# Patient Record
Sex: MALE | URBAN - METROPOLITAN AREA
[De-identification: names, ages, dates, MRNs, and addresses within clinical notes are randomized per-mention and may not be internally consistent; named-entity substitution may affect disease eponyms.]

---

## 2021-01-14 ENCOUNTER — TRANSCRIBE ORDERS (OUTPATIENT)
Dept: URGENT CARE | Facility: CLINIC | Age: 46
End: 2021-01-14

## 2021-01-14 DIAGNOSIS — Z02.1 PHYSICAL EXAM, PRE-EMPLOYMENT: Primary | ICD-10-CM

## 2021-01-14 PROCEDURE — 86787 VARICELLA-ZOSTER ANTIBODY: CPT | Performed by: PHYSICIAN ASSISTANT

## 2021-01-14 PROCEDURE — 86762 RUBELLA ANTIBODY: CPT | Performed by: PHYSICIAN ASSISTANT

## 2021-01-14 PROCEDURE — 86765 RUBEOLA ANTIBODY: CPT | Performed by: PHYSICIAN ASSISTANT

## 2021-01-14 PROCEDURE — 86735 MUMPS ANTIBODY: CPT | Performed by: PHYSICIAN ASSISTANT

## 2021-01-14 PROCEDURE — 86480 TB TEST CELL IMMUN MEASURE: CPT | Performed by: PHYSICIAN ASSISTANT

## 2021-03-18 ENCOUNTER — IMMUNIZATIONS (OUTPATIENT)
Dept: FAMILY MEDICINE CLINIC | Facility: HOSPITAL | Age: 46
End: 2021-03-18

## 2021-03-18 DIAGNOSIS — Z23 ENCOUNTER FOR IMMUNIZATION: Primary | ICD-10-CM

## 2021-03-18 PROCEDURE — 91301 SARS-COV-2 / COVID-19 MRNA VACCINE (MODERNA) 100 MCG: CPT

## 2021-03-18 PROCEDURE — 0011A SARS-COV-2 / COVID-19 MRNA VACCINE (MODERNA) 100 MCG: CPT

## 2021-04-16 ENCOUNTER — IMMUNIZATIONS (OUTPATIENT)
Dept: FAMILY MEDICINE CLINIC | Facility: HOSPITAL | Age: 46
End: 2021-04-16

## 2021-04-16 DIAGNOSIS — Z23 ENCOUNTER FOR IMMUNIZATION: Primary | ICD-10-CM

## 2021-04-16 PROCEDURE — 91301 SARS-COV-2 / COVID-19 MRNA VACCINE (MODERNA) 100 MCG: CPT

## 2021-04-16 PROCEDURE — 0012A SARS-COV-2 / COVID-19 MRNA VACCINE (MODERNA) 100 MCG: CPT

## 2022-02-11 ENCOUNTER — IMMUNIZATIONS (OUTPATIENT)
Dept: FAMILY MEDICINE CLINIC | Facility: HOSPITAL | Age: 47
End: 2022-02-11

## 2022-02-11 DIAGNOSIS — Z23 ENCOUNTER FOR IMMUNIZATION: Primary | ICD-10-CM

## 2022-02-11 PROCEDURE — 0064A COVID-19 MODERNA VACC 0.25 ML BOOSTER: CPT

## 2022-02-11 PROCEDURE — 91306 COVID-19 MODERNA VACC 0.25 ML BOOSTER: CPT

## 2023-04-06 ENCOUNTER — HOSPITAL ENCOUNTER (OUTPATIENT)
Dept: RADIOLOGY | Facility: HOSPITAL | Age: 48
Discharge: HOME | End: 2023-04-06

## 2023-04-06 DIAGNOSIS — R07.81 PAIN IN RIB: ICD-10-CM

## 2024-09-08 ENCOUNTER — APPOINTMENT (OUTPATIENT)
Dept: RADIOLOGY | Facility: HOSPITAL | Age: 49
End: 2024-09-08
Payer: OTHER MISCELLANEOUS

## 2024-09-08 ENCOUNTER — ANESTHESIA (OUTPATIENT)
Dept: PERIOP | Facility: HOSPITAL | Age: 49
End: 2024-09-08
Payer: OTHER MISCELLANEOUS

## 2024-09-08 ENCOUNTER — APPOINTMENT (EMERGENCY)
Dept: RADIOLOGY | Facility: HOSPITAL | Age: 49
End: 2024-09-08
Payer: OTHER MISCELLANEOUS

## 2024-09-08 ENCOUNTER — ANESTHESIA EVENT (OUTPATIENT)
Dept: PERIOP | Facility: HOSPITAL | Age: 49
End: 2024-09-08
Payer: OTHER MISCELLANEOUS

## 2024-09-08 ENCOUNTER — HOSPITAL ENCOUNTER (OUTPATIENT)
Facility: HOSPITAL | Age: 49
Setting detail: OBSERVATION
Discharge: HOME | End: 2024-09-10
Attending: EMERGENCY MEDICINE | Admitting: INTERNAL MEDICINE
Payer: OTHER MISCELLANEOUS

## 2024-09-08 DIAGNOSIS — S82.121A CLOSED DISPLACED FRACTURE OF LATERAL CONDYLE OF RIGHT TIBIA, INITIAL ENCOUNTER: Primary | ICD-10-CM

## 2024-09-08 DIAGNOSIS — S82.143A TIBIAL PLATEAU FRACTURE: ICD-10-CM

## 2024-09-08 PROBLEM — E11.9 TYPE 2 DIABETES MELLITUS WITHOUT COMPLICATION, WITHOUT LONG-TERM CURRENT USE OF INSULIN (HCC): Status: ACTIVE | Noted: 2024-09-08

## 2024-09-08 PROBLEM — E66.9 OBESITY: Status: ACTIVE | Noted: 2024-09-08
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
## 2024-09-08 PROBLEM — M25.561 ACUTE PAIN OF RIGHT KNEE: Status: ACTIVE | Noted: 2024-09-08

## 2024-09-08 PROBLEM — I10 PRIMARY HYPERTENSION: Status: ACTIVE | Noted: 2024-09-08

## 2024-09-08 PROBLEM — S82.141A CLOSED FRACTURE OF RIGHT TIBIAL PLATEAU: Status: ACTIVE | Noted: 2024-09-08

## 2024-09-08 PROBLEM — S82.151A: Status: ACTIVE | Noted: 2024-09-08

## 2024-09-08 LAB
ALBUMIN SERPL BCG-MCNC: 4.3 G/DL (ref 3.5–5)
ALP SERPL-CCNC: 56 U/L (ref 34–104)
ALT SERPL W P-5'-P-CCNC: 35 U/L (ref 7–52)
ANION GAP SERPL CALCULATED.3IONS-SCNC: 11 MMOL/L (ref 4–13)
APTT PPP: 20 SECONDS (ref 23–34)
AST SERPL W P-5'-P-CCNC: 34 U/L (ref 13–39)
BASOPHILS # BLD AUTO: 0.06 THOUSANDS/ÂΜL (ref 0–0.1)
BASOPHILS NFR BLD AUTO: 1 % (ref 0–1)
BILIRUB SERPL-MCNC: 0.53 MG/DL (ref 0.2–1)
BUN SERPL-MCNC: 20 MG/DL (ref 5–25)
CALCIUM SERPL-MCNC: 9.4 MG/DL (ref 8.4–10.2)
CHLORIDE SERPL-SCNC: 102 MMOL/L (ref 96–108)
CO2 SERPL-SCNC: 22 MMOL/L (ref 21–32)
CREAT SERPL-MCNC: 0.98 MG/DL (ref 0.6–1.3)
EOSINOPHIL # BLD AUTO: 0.17 THOUSAND/ÂΜL (ref 0–0.61)
EOSINOPHIL NFR BLD AUTO: 2 % (ref 0–6)
ERYTHROCYTE [DISTWIDTH] IN BLOOD BY AUTOMATED COUNT: 12.8 % (ref 11.6–15.1)
GFR SERPL CREATININE-BSD FRML MDRD: 92 ML/MIN/1.73SQ M
GLUCOSE SERPL-MCNC: 162 MG/DL (ref 65–140)
GLUCOSE SERPL-MCNC: 182 MG/DL (ref 65–140)
HCT VFR BLD AUTO: 45.5 % (ref 36.5–49.3)
HGB BLD-MCNC: 15.4 G/DL (ref 12–17)
IMM GRANULOCYTES # BLD AUTO: 0.03 THOUSAND/UL (ref 0–0.2)
IMM GRANULOCYTES NFR BLD AUTO: 0 % (ref 0–2)
INR PPP: 0.95 (ref 0.85–1.19)
LYMPHOCYTES # BLD AUTO: 3.21 THOUSANDS/ÂΜL (ref 0.6–4.47)
LYMPHOCYTES NFR BLD AUTO: 41 % (ref 14–44)
MCH RBC QN AUTO: 29.3 PG (ref 26.8–34.3)
MCHC RBC AUTO-ENTMCNC: 33.8 G/DL (ref 31.4–37.4)
MCV RBC AUTO: 87 FL (ref 82–98)
MONOCYTES # BLD AUTO: 0.87 THOUSAND/ÂΜL (ref 0.17–1.22)
MONOCYTES NFR BLD AUTO: 11 % (ref 4–12)
NEUTROPHILS # BLD AUTO: 3.43 THOUSANDS/ÂΜL (ref 1.85–7.62)
NEUTS SEG NFR BLD AUTO: 45 % (ref 43–75)
NRBC BLD AUTO-RTO: 0 /100 WBCS
PLATELET # BLD AUTO: 199 THOUSANDS/UL (ref 149–390)
PMV BLD AUTO: 10.2 FL (ref 8.9–12.7)
POTASSIUM SERPL-SCNC: 4.2 MMOL/L (ref 3.5–5.3)
PROT SERPL-MCNC: 6.8 G/DL (ref 6.4–8.4)
PROTHROMBIN TIME: 13.2 SECONDS (ref 12.3–15)
RBC # BLD AUTO: 5.25 MILLION/UL (ref 3.88–5.62)
SODIUM SERPL-SCNC: 135 MMOL/L (ref 135–147)
WBC # BLD AUTO: 7.77 THOUSAND/UL (ref 4.31–10.16)

## 2024-09-08 PROCEDURE — C1713 ANCHOR/SCREW BN/BN,TIS/BN: HCPCS | Performed by: ORTHOPAEDIC SURGERY

## 2024-09-08 PROCEDURE — 73590 X-RAY EXAM OF LOWER LEG: CPT

## 2024-09-08 PROCEDURE — 73564 X-RAY EXAM KNEE 4 OR MORE: CPT

## 2024-09-08 PROCEDURE — 27540 TREAT KNEE FRACTURE: CPT | Performed by: PHYSICIAN ASSISTANT

## 2024-09-08 PROCEDURE — 85610 PROTHROMBIN TIME: CPT | Performed by: EMERGENCY MEDICINE

## 2024-09-08 PROCEDURE — 99222 1ST HOSP IP/OBS MODERATE 55: CPT

## 2024-09-08 PROCEDURE — 27535 TREAT KNEE FRACTURE: CPT | Performed by: ORTHOPAEDIC SURGERY

## 2024-09-08 PROCEDURE — 27540 TREAT KNEE FRACTURE: CPT | Performed by: ORTHOPAEDIC SURGERY

## 2024-09-08 PROCEDURE — 73700 CT LOWER EXTREMITY W/O DYE: CPT

## 2024-09-08 PROCEDURE — 99205 OFFICE O/P NEW HI 60 MIN: CPT | Performed by: ORTHOPAEDIC SURGERY

## 2024-09-08 PROCEDURE — NC001 PR NO CHARGE: Performed by: ORTHOPAEDIC SURGERY

## 2024-09-08 PROCEDURE — 80053 COMPREHEN METABOLIC PANEL: CPT | Performed by: EMERGENCY MEDICINE

## 2024-09-08 PROCEDURE — 99285 EMERGENCY DEPT VISIT HI MDM: CPT | Performed by: EMERGENCY MEDICINE

## 2024-09-08 PROCEDURE — 27535 TREAT KNEE FRACTURE: CPT | Performed by: PHYSICIAN ASSISTANT

## 2024-09-08 PROCEDURE — 99284 EMERGENCY DEPT VISIT MOD MDM: CPT

## 2024-09-08 PROCEDURE — 96376 TX/PRO/DX INJ SAME DRUG ADON: CPT

## 2024-09-08 PROCEDURE — 36415 COLL VENOUS BLD VENIPUNCTURE: CPT | Performed by: EMERGENCY MEDICINE

## 2024-09-08 PROCEDURE — 96374 THER/PROPH/DIAG INJ IV PUSH: CPT

## 2024-09-08 PROCEDURE — 85730 THROMBOPLASTIN TIME PARTIAL: CPT | Performed by: EMERGENCY MEDICINE

## 2024-09-08 PROCEDURE — 85025 COMPLETE CBC W/AUTO DIFF WBC: CPT | Performed by: EMERGENCY MEDICINE

## 2024-09-08 PROCEDURE — 82948 REAGENT STRIP/BLOOD GLUCOSE: CPT

## 2024-09-08 DEVICE — 3.5MM CORTEX SCREW SELF-TAPPING 36MM: Type: IMPLANTABLE DEVICE | Site: LEG | Status: FUNCTIONAL

## 2024-09-08 DEVICE — 3.5MM CORTEX SCREW SELF-TAPPING 28MM: Type: IMPLANTABLE DEVICE | Site: LEG | Status: FUNCTIONAL

## 2024-09-08 DEVICE — 3.5MM CORTEX SCREW SELF-TAPPING 38MM: Type: IMPLANTABLE DEVICE | Site: LEG | Status: FUNCTIONAL

## 2024-09-08 DEVICE — WASHER 7.0MM: Type: IMPLANTABLE DEVICE | Site: LEG | Status: FUNCTIONAL

## 2024-09-08 DEVICE — 3.5MM LCP T-PLATE 3H HEAD/ 3H SHAFT/50MM-RIGHT ANGLE
Type: IMPLANTABLE DEVICE | Site: LEG | Status: FUNCTIONAL
Brand: LCP

## 2024-09-08 DEVICE — 3.5MM CORTEX SCREW SELF-TAPPING 26MM: Type: IMPLANTABLE DEVICE | Site: LEG | Status: FUNCTIONAL

## 2024-09-08 DEVICE — 3.5MM VA-LCP PROX TIBIA PLATE SMALL BEND/8H/147MM/RIGHT
Type: IMPLANTABLE DEVICE | Site: LEG | Status: FUNCTIONAL
Brand: VA-LCP

## 2024-09-08 DEVICE — 3.5MM VARIABLE ANGLE LOCKING SCREW/SLF-TPNG/STRDRV/80MM: Type: IMPLANTABLE DEVICE | Site: LEG | Status: FUNCTIONAL

## 2024-09-08 DEVICE — 3.5MM CORTEX SCREW SELF-TAPPING 50MM: Type: IMPLANTABLE DEVICE | Site: LEG | Status: FUNCTIONAL

## 2024-09-08 DEVICE — 3.5MM VARIABLE ANGLE LOCKING SCREW/SLF-TPNG/STRDRV/75MM: Type: IMPLANTABLE DEVICE | Site: LEG | Status: FUNCTIONAL

## 2024-09-08 DEVICE — 3.5MM PELVIC CORTEX SCREW SELF-TAPPING 70MM: Type: IMPLANTABLE DEVICE | Site: LEG | Status: FUNCTIONAL

## 2024-09-08 RX ORDER — LISINOPRIL 10 MG/1
10 TABLET ORAL DAILY
Status: DISCONTINUED | OUTPATIENT
Start: 2024-09-09 | End: 2024-09-09

## 2024-09-08 RX ORDER — MAGNESIUM HYDROXIDE 1200 MG/15ML
LIQUID ORAL AS NEEDED
Status: DISCONTINUED | OUTPATIENT
Start: 2024-09-08 | End: 2024-09-08 | Stop reason: HOSPADM

## 2024-09-08 RX ORDER — VANCOMYCIN HYDROCHLORIDE 1 G/20ML
INJECTION, POWDER, LYOPHILIZED, FOR SOLUTION INTRAVENOUS AS NEEDED
Status: DISCONTINUED | OUTPATIENT
Start: 2024-09-08 | End: 2024-09-08 | Stop reason: HOSPADM

## 2024-09-08 RX ORDER — HYDROMORPHONE HCL/PF 1 MG/ML
SYRINGE (ML) INJECTION AS NEEDED
Status: DISCONTINUED | OUTPATIENT
Start: 2024-09-08 | End: 2024-09-08

## 2024-09-08 RX ORDER — ACETAMINOPHEN 10 MG/ML
1000 INJECTION, SOLUTION INTRAVENOUS DAILY
Status: DISCONTINUED | OUTPATIENT
Start: 2024-09-08 | End: 2024-09-09

## 2024-09-08 RX ORDER — HYDROMORPHONE HCL/PF 1 MG/ML
0.5 SYRINGE (ML) INJECTION
Status: DISCONTINUED | OUTPATIENT
Start: 2024-09-08 | End: 2024-09-08 | Stop reason: HOSPADM

## 2024-09-08 RX ORDER — PANTOPRAZOLE SODIUM 40 MG/1
40 TABLET, DELAYED RELEASE ORAL DAILY
Status: DISCONTINUED | OUTPATIENT
Start: 2024-09-09 | End: 2024-09-10 | Stop reason: HOSPADM

## 2024-09-08 RX ORDER — HEPARIN SODIUM 5000 [USP'U]/ML
5000 INJECTION, SOLUTION INTRAVENOUS; SUBCUTANEOUS EVERY 8 HOURS SCHEDULED
Status: DISCONTINUED | OUTPATIENT
Start: 2024-09-09 | End: 2024-09-08

## 2024-09-08 RX ORDER — PROPOFOL 10 MG/ML
INJECTION, EMULSION INTRAVENOUS AS NEEDED
Status: DISCONTINUED | OUTPATIENT
Start: 2024-09-08 | End: 2024-09-08

## 2024-09-08 RX ORDER — ACETAMINOPHEN 325 MG/1
650 TABLET ORAL EVERY 6 HOURS PRN
Status: DISCONTINUED | OUTPATIENT
Start: 2024-09-08 | End: 2024-09-09

## 2024-09-08 RX ORDER — ONDANSETRON 2 MG/ML
4 INJECTION INTRAMUSCULAR; INTRAVENOUS ONCE AS NEEDED
Status: COMPLETED | OUTPATIENT
Start: 2024-09-08 | End: 2024-09-08

## 2024-09-08 RX ORDER — ENOXAPARIN SODIUM 100 MG/ML
40 INJECTION SUBCUTANEOUS
Status: DISCONTINUED | OUTPATIENT
Start: 2024-09-09 | End: 2024-09-10 | Stop reason: HOSPADM

## 2024-09-08 RX ORDER — HYDROMORPHONE HCL/PF 1 MG/ML
0.5 SYRINGE (ML) INJECTION ONCE
Status: COMPLETED | OUTPATIENT
Start: 2024-09-08 | End: 2024-09-08

## 2024-09-08 RX ORDER — ROCURONIUM BROMIDE 10 MG/ML
INJECTION, SOLUTION INTRAVENOUS AS NEEDED
Status: DISCONTINUED | OUTPATIENT
Start: 2024-09-08 | End: 2024-09-08

## 2024-09-08 RX ORDER — ONDANSETRON 2 MG/ML
INJECTION INTRAMUSCULAR; INTRAVENOUS AS NEEDED
Status: DISCONTINUED | OUTPATIENT
Start: 2024-09-08 | End: 2024-09-08

## 2024-09-08 RX ORDER — PROMETHAZINE HYDROCHLORIDE 25 MG/ML
12.5 INJECTION, SOLUTION INTRAMUSCULAR; INTRAVENOUS ONCE AS NEEDED
Status: COMPLETED | OUTPATIENT
Start: 2024-09-08 | End: 2024-09-08

## 2024-09-08 RX ORDER — CEFAZOLIN SODIUM 2 G/50ML
2000 SOLUTION INTRAVENOUS EVERY 8 HOURS
Status: COMPLETED | OUTPATIENT
Start: 2024-09-09 | End: 2024-09-09

## 2024-09-08 RX ORDER — HYDROMORPHONE HCL/PF 1 MG/ML
0.2 SYRINGE (ML) INJECTION EVERY 4 HOURS PRN
Status: DISCONTINUED | OUTPATIENT
Start: 2024-09-08 | End: 2024-09-08

## 2024-09-08 RX ORDER — ATORVASTATIN CALCIUM 20 MG/1
20 TABLET, FILM COATED ORAL
Status: DISCONTINUED | OUTPATIENT
Start: 2024-09-08 | End: 2024-09-10 | Stop reason: HOSPADM

## 2024-09-08 RX ORDER — BENAZEPRIL HYDROCHLORIDE 10 MG/1
10 TABLET ORAL DAILY
COMMUNITY

## 2024-09-08 RX ORDER — DEXAMETHASONE SODIUM PHOSPHATE 10 MG/ML
INJECTION, SOLUTION INTRAMUSCULAR; INTRAVENOUS AS NEEDED
Status: DISCONTINUED | OUTPATIENT
Start: 2024-09-08 | End: 2024-09-08

## 2024-09-08 RX ORDER — MIDAZOLAM HYDROCHLORIDE 2 MG/2ML
INJECTION, SOLUTION INTRAMUSCULAR; INTRAVENOUS AS NEEDED
Status: DISCONTINUED | OUTPATIENT
Start: 2024-09-08 | End: 2024-09-08

## 2024-09-08 RX ORDER — KETAMINE HCL IN NACL, ISO-OSM 100MG/10ML
SYRINGE (ML) INJECTION AS NEEDED
Status: DISCONTINUED | OUTPATIENT
Start: 2024-09-08 | End: 2024-09-08

## 2024-09-08 RX ORDER — FENTANYL CITRATE/PF 50 MCG/ML
50 SYRINGE (ML) INJECTION
Status: DISCONTINUED | OUTPATIENT
Start: 2024-09-08 | End: 2024-09-08 | Stop reason: HOSPADM

## 2024-09-08 RX ORDER — CEFAZOLIN SODIUM 2 G/50ML
2000 SOLUTION INTRAVENOUS
Status: COMPLETED | OUTPATIENT
Start: 2024-09-08 | End: 2024-09-08

## 2024-09-08 RX ORDER — HYDROMORPHONE HCL/PF 1 MG/ML
0.2 SYRINGE (ML) INJECTION EVERY 2 HOUR PRN
Status: DISCONTINUED | OUTPATIENT
Start: 2024-09-08 | End: 2024-09-08

## 2024-09-08 RX ORDER — SODIUM CHLORIDE, SODIUM LACTATE, POTASSIUM CHLORIDE, CALCIUM CHLORIDE 600; 310; 30; 20 MG/100ML; MG/100ML; MG/100ML; MG/100ML
INJECTION, SOLUTION INTRAVENOUS CONTINUOUS PRN
Status: DISCONTINUED | OUTPATIENT
Start: 2024-09-08 | End: 2024-09-08

## 2024-09-08 RX ORDER — ACETAMINOPHEN 10 MG/ML
1000 INJECTION, SOLUTION INTRAVENOUS EVERY 6 HOURS PRN
Status: DISCONTINUED | OUTPATIENT
Start: 2024-09-08 | End: 2024-09-08

## 2024-09-08 RX ORDER — PANTOPRAZOLE SODIUM 40 MG/1
40 TABLET, DELAYED RELEASE ORAL DAILY
COMMUNITY

## 2024-09-08 RX ORDER — LIDOCAINE HYDROCHLORIDE 20 MG/ML
INJECTION, SOLUTION EPIDURAL; INFILTRATION; INTRACAUDAL; PERINEURAL AS NEEDED
Status: DISCONTINUED | OUTPATIENT
Start: 2024-09-08 | End: 2024-09-08

## 2024-09-08 RX ORDER — FENTANYL CITRATE 50 UG/ML
INJECTION, SOLUTION INTRAMUSCULAR; INTRAVENOUS AS NEEDED
Status: DISCONTINUED | OUTPATIENT
Start: 2024-09-08 | End: 2024-09-08

## 2024-09-08 RX ORDER — INSULIN LISPRO 100 [IU]/ML
1-6 INJECTION, SOLUTION INTRAVENOUS; SUBCUTANEOUS EVERY 6 HOURS SCHEDULED
Status: DISCONTINUED | OUTPATIENT
Start: 2024-09-08 | End: 2024-09-09

## 2024-09-08 RX ORDER — ATORVASTATIN CALCIUM 20 MG/1
20 TABLET, FILM COATED ORAL DAILY
COMMUNITY

## 2024-09-08 RX ADMIN — PROPOFOL 200 MG: 10 INJECTION, EMULSION INTRAVENOUS at 20:01

## 2024-09-08 RX ADMIN — CEFAZOLIN SODIUM 2000 MG: 2 SOLUTION INTRAVENOUS at 20:05

## 2024-09-08 RX ADMIN — SUGAMMADEX 200 MG: 100 INJECTION, SOLUTION INTRAVENOUS at 21:28

## 2024-09-08 RX ADMIN — DEXAMETHASONE SODIUM PHOSPHATE 10 MG: 10 INJECTION, SOLUTION INTRAMUSCULAR; INTRAVENOUS at 20:02

## 2024-09-08 RX ADMIN — HYDROMORPHONE HYDROCHLORIDE 0.5 MG: 1 INJECTION, SOLUTION INTRAMUSCULAR; INTRAVENOUS; SUBCUTANEOUS at 20:26

## 2024-09-08 RX ADMIN — ACETAMINOPHEN 1000 MG: 10 INJECTION INTRAVENOUS at 18:08

## 2024-09-08 RX ADMIN — MIDAZOLAM 2 MG: 1 INJECTION INTRAMUSCULAR; INTRAVENOUS at 19:56

## 2024-09-08 RX ADMIN — ROCURONIUM BROMIDE 50 MG: 10 INJECTION, SOLUTION INTRAVENOUS at 20:01

## 2024-09-08 RX ADMIN — HYDROMORPHONE HYDROCHLORIDE 0.5 MG: 1 INJECTION, SOLUTION INTRAMUSCULAR; INTRAVENOUS; SUBCUTANEOUS at 16:52

## 2024-09-08 RX ADMIN — Medication 20 MG: at 20:31

## 2024-09-08 RX ADMIN — LIDOCAINE HYDROCHLORIDE 100 MG: 20 INJECTION, SOLUTION EPIDURAL; INFILTRATION; INTRACAUDAL; PERINEURAL at 20:01

## 2024-09-08 RX ADMIN — FENTANYL CITRATE 100 MCG: 50 INJECTION, SOLUTION INTRAMUSCULAR; INTRAVENOUS at 20:01

## 2024-09-08 RX ADMIN — SODIUM CHLORIDE, SODIUM LACTATE, POTASSIUM CHLORIDE, AND CALCIUM CHLORIDE: .6; .31; .03; .02 INJECTION, SOLUTION INTRAVENOUS at 19:55

## 2024-09-08 RX ADMIN — HYDROMORPHONE HYDROCHLORIDE 0.5 MG: 1 INJECTION, SOLUTION INTRAMUSCULAR; INTRAVENOUS; SUBCUTANEOUS at 20:31

## 2024-09-08 RX ADMIN — PROMETHAZINE HYDROCHLORIDE 12.5 MG: 25 INJECTION INTRAMUSCULAR; INTRAVENOUS at 22:29

## 2024-09-08 RX ADMIN — ONDANSETRON 4 MG: 2 INJECTION INTRAMUSCULAR; INTRAVENOUS at 22:08

## 2024-09-08 RX ADMIN — FENTANYL CITRATE 50 MCG: 50 INJECTION INTRAMUSCULAR; INTRAVENOUS at 22:14

## 2024-09-08 RX ADMIN — INSULIN LISPRO 1 UNITS: 100 INJECTION, SOLUTION INTRAVENOUS; SUBCUTANEOUS at 19:04

## 2024-09-08 RX ADMIN — HYDROMORPHONE HYDROCHLORIDE 0.5 MG: 1 INJECTION, SOLUTION INTRAMUSCULAR; INTRAVENOUS; SUBCUTANEOUS at 16:09

## 2024-09-08 RX ADMIN — Medication 30 MG: at 20:12

## 2024-09-08 RX ADMIN — ONDANSETRON 4 MG: 2 INJECTION INTRAMUSCULAR; INTRAVENOUS at 20:05
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
## 2024-09-08 NOTE — Clinical Note
Case was discussed with  and the patient's admission status was agreed to be Admission Status: inpatient status to the service of Dr. Panchal

## 2024-09-08 NOTE — ASSESSMENT & PLAN NOTE
"No results found for: \"HGBA1C\"    No results for input(s): \"POCGLU\" in the last 72 hours.    Blood Sugar Average: Last 72 hrs:  9/5/2024  HBA1C: 6.8       SSI with accu checks       "
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
## 2024-09-08 NOTE — ANESTHESIA PREPROCEDURE EVALUATION
Procedure:  OPEN REDUCTION W/ INTERNAL FIXATION (ORIF) TIBIA (Right: Knee)    Relevant Problems   CARDIO   (+) Primary hypertension      ENDO   (+) Type 2 diabetes mellitus without complication, without long-term current use of insulin ([facility])      Confirmed NPO appropriate. Bottle of water at 3:30pm. No solid food since 12:00pm.    Physical Exam    Airway    Mallampati score: II  TM Distance: >3 FB  Neck ROM: full     Dental   No notable dental hx     Cardiovascular      Pulmonary      Other Findings        Anesthesia Plan  ASA Score- 2 Emergent    Anesthesia Type- general with ASA Monitors.         Additional Monitors:     Airway Plan: ETT.    Comment: Discussed risk for euglycemic DKA in setting of jardiance use and low threshold to seek medical attention for any sort of general malaise post procedure.       Plan Factors-Exercise tolerance (METS): >4 METS.Exercise comment: Able to climb two flights of stairs without cardiopulmonary limitation.    Chart reviewed.   Existing labs reviewed.     Patient is not a current smoker.  Patient did not smoke on day of surgery.            Induction- intravenous.    Postoperative Plan-     Perioperative Resuscitation Plan - Level 1 - Full Code.       Informed Consent- Anesthetic plan and risks discussed with patient.
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
## 2024-09-08 NOTE — PROGRESS NOTES
This is a 49 yo male with a displaced Schatzker II plateau fracture with anterior column extension. We will go to surgery today for ORIF.

## 2024-09-08 NOTE — ASSESSMENT & PLAN NOTE
Seondary to tibial plateau fracture     Swelling noted along the prixam tibia RT side   No erythema.  Tender to palpation    Intact fine touch sensory in the lower extremities bilaterally   Intact ROM at ankle/foot bilaterally   DP/PT: 2+       Plan:   Pain regimen as documented  Tylenol scheduled   Interval follow up

## 2024-09-08 NOTE — ASSESSMENT & PLAN NOTE
XR: Schatzker II plateau fracture  . Interpreted by Dr. Munroe  ( orthopedics)   Swelling noted along the prixam tibia RT side   No erythema.  Tender to palpation    Intact fine touch sensory in the lower extremities bilaterally   Intact ROM at ankle/foot bilaterally   DP/PT: 2+       Plan:   ORIF tonight per orthopedics  Pain management per protocol   NPO for now   Am labs  Bed rest   Appreciate further recommendations from Orthopedics   Interval follow up   PTOT as permissible in am per orthopedics recs

## 2024-09-09 PROBLEM — R65.10 SIRS (SYSTEMIC INFLAMMATORY RESPONSE SYNDROME) (HCC): Status: ACTIVE | Noted: 2024-09-09

## 2024-09-09 LAB
ALBUMIN SERPL BCG-MCNC: 4.3 G/DL (ref 3.5–5)
ALP SERPL-CCNC: 43 U/L (ref 34–104)
ALT SERPL W P-5'-P-CCNC: 36 U/L (ref 7–52)
ANION GAP SERPL CALCULATED.3IONS-SCNC: 13 MMOL/L (ref 4–13)
AST SERPL W P-5'-P-CCNC: 36 U/L (ref 13–39)
BASOPHILS # BLD AUTO: 0.02 THOUSANDS/ÂΜL (ref 0–0.1)
BASOPHILS NFR BLD AUTO: 0 % (ref 0–1)
BILIRUB SERPL-MCNC: 0.72 MG/DL (ref 0.2–1)
BUN SERPL-MCNC: 17 MG/DL (ref 5–25)
CALCIUM SERPL-MCNC: 8.5 MG/DL (ref 8.4–10.2)
CHLORIDE SERPL-SCNC: 102 MMOL/L (ref 96–108)
CO2 SERPL-SCNC: 19 MMOL/L (ref 21–32)
CREAT SERPL-MCNC: 0.96 MG/DL (ref 0.6–1.3)
EOSINOPHIL # BLD AUTO: 0 THOUSAND/ÂΜL (ref 0–0.61)
EOSINOPHIL NFR BLD AUTO: 0 % (ref 0–6)
ERYTHROCYTE [DISTWIDTH] IN BLOOD BY AUTOMATED COUNT: 13 % (ref 11.6–15.1)
GFR SERPL CREATININE-BSD FRML MDRD: 94 ML/MIN/1.73SQ M
GLUCOSE SERPL-MCNC: 161 MG/DL (ref 65–140)
GLUCOSE SERPL-MCNC: 180 MG/DL (ref 65–140)
GLUCOSE SERPL-MCNC: 183 MG/DL (ref 65–140)
GLUCOSE SERPL-MCNC: 235 MG/DL (ref 65–140)
HCT VFR BLD AUTO: 43 % (ref 36.5–49.3)
HGB BLD-MCNC: 14.6 G/DL (ref 12–17)
IMM GRANULOCYTES # BLD AUTO: 0.08 THOUSAND/UL (ref 0–0.2)
IMM GRANULOCYTES NFR BLD AUTO: 1 % (ref 0–2)
LYMPHOCYTES # BLD AUTO: 0.76 THOUSANDS/ÂΜL (ref 0.6–4.47)
LYMPHOCYTES NFR BLD AUTO: 6 % (ref 14–44)
MAGNESIUM SERPL-MCNC: 2 MG/DL (ref 1.9–2.7)
MCH RBC QN AUTO: 29.5 PG (ref 26.8–34.3)
MCHC RBC AUTO-ENTMCNC: 34 G/DL (ref 31.4–37.4)
MCV RBC AUTO: 87 FL (ref 82–98)
MONOCYTES # BLD AUTO: 0.63 THOUSAND/ÂΜL (ref 0.17–1.22)
MONOCYTES NFR BLD AUTO: 5 % (ref 4–12)
NEUTROPHILS # BLD AUTO: 11.98 THOUSANDS/ÂΜL (ref 1.85–7.62)
NEUTS SEG NFR BLD AUTO: 88 % (ref 43–75)
NRBC BLD AUTO-RTO: 0 /100 WBCS
PLATELET # BLD AUTO: 218 THOUSANDS/UL (ref 149–390)
PMV BLD AUTO: 10.3 FL (ref 8.9–12.7)
POTASSIUM SERPL-SCNC: 4.7 MMOL/L (ref 3.5–5.3)
PROT SERPL-MCNC: 6.9 G/DL (ref 6.4–8.4)
RBC # BLD AUTO: 4.95 MILLION/UL (ref 3.88–5.62)
SODIUM SERPL-SCNC: 134 MMOL/L (ref 135–147)
WBC # BLD AUTO: 13.47 THOUSAND/UL (ref 4.31–10.16)

## 2024-09-09 PROCEDURE — 99024 POSTOP FOLLOW-UP VISIT: CPT | Performed by: ORTHOPAEDIC SURGERY

## 2024-09-09 PROCEDURE — 82948 REAGENT STRIP/BLOOD GLUCOSE: CPT

## 2024-09-09 PROCEDURE — 97530 THERAPEUTIC ACTIVITIES: CPT

## 2024-09-09 PROCEDURE — 83735 ASSAY OF MAGNESIUM: CPT

## 2024-09-09 PROCEDURE — 85025 COMPLETE CBC W/AUTO DIFF WBC: CPT

## 2024-09-09 PROCEDURE — 97167 OT EVAL HIGH COMPLEX 60 MIN: CPT

## 2024-09-09 PROCEDURE — 99232 SBSQ HOSP IP/OBS MODERATE 35: CPT

## 2024-09-09 PROCEDURE — 80053 COMPREHEN METABOLIC PANEL: CPT

## 2024-09-09 PROCEDURE — 97163 PT EVAL HIGH COMPLEX 45 MIN: CPT

## 2024-09-09 RX ORDER — PROMETHAZINE HYDROCHLORIDE 25 MG/ML
25 INJECTION, SOLUTION INTRAMUSCULAR; INTRAVENOUS EVERY 6 HOURS PRN
Status: DISCONTINUED | OUTPATIENT
Start: 2024-09-09 | End: 2024-09-09

## 2024-09-09 RX ORDER — ACETAMINOPHEN 325 MG/1
650 TABLET ORAL EVERY 8 HOURS SCHEDULED
Status: DISCONTINUED | OUTPATIENT
Start: 2024-09-09 | End: 2024-09-09

## 2024-09-09 RX ORDER — NAPROXEN 250 MG/1
250 TABLET ORAL 2 TIMES DAILY WITH MEALS
Status: DISCONTINUED | OUTPATIENT
Start: 2024-09-09 | End: 2024-09-10 | Stop reason: HOSPADM

## 2024-09-09 RX ORDER — ONDANSETRON 2 MG/ML
4 INJECTION INTRAMUSCULAR; INTRAVENOUS EVERY 4 HOURS PRN
Status: DISCONTINUED | OUTPATIENT
Start: 2024-09-09 | End: 2024-09-10 | Stop reason: HOSPADM

## 2024-09-09 RX ORDER — HYDROMORPHONE HCL/PF 1 MG/ML
0.5 SYRINGE (ML) INJECTION ONCE
Status: COMPLETED | OUTPATIENT
Start: 2024-09-09 | End: 2024-09-09

## 2024-09-09 RX ORDER — OXYCODONE HYDROCHLORIDE 5 MG/1
5 TABLET ORAL EVERY 6 HOURS PRN
Status: DISCONTINUED | OUTPATIENT
Start: 2024-09-09 | End: 2024-09-09

## 2024-09-09 RX ORDER — OXYCODONE HYDROCHLORIDE 10 MG/1
10 TABLET ORAL EVERY 6 HOURS PRN
Status: DISCONTINUED | OUTPATIENT
Start: 2024-09-09 | End: 2024-09-09

## 2024-09-09 RX ORDER — HYDROMORPHONE HCL/PF 1 MG/ML
0.5 SYRINGE (ML) INJECTION
Status: DISCONTINUED | OUTPATIENT
Start: 2024-09-09 | End: 2024-09-10 | Stop reason: HOSPADM

## 2024-09-09 RX ORDER — INSULIN LISPRO 100 [IU]/ML
1-6 INJECTION, SOLUTION INTRAVENOUS; SUBCUTANEOUS
Status: DISCONTINUED | OUTPATIENT
Start: 2024-09-10 | End: 2024-09-10 | Stop reason: HOSPADM

## 2024-09-09 RX ORDER — ACETAMINOPHEN 325 MG/1
975 TABLET ORAL EVERY 8 HOURS SCHEDULED
Status: DISCONTINUED | OUTPATIENT
Start: 2024-09-09 | End: 2024-09-10 | Stop reason: HOSPADM

## 2024-09-09 RX ORDER — OXYCODONE HYDROCHLORIDE 5 MG/1
5 TABLET ORAL EVERY 4 HOURS PRN
Status: DISCONTINUED | OUTPATIENT
Start: 2024-09-09 | End: 2024-09-10 | Stop reason: HOSPADM

## 2024-09-09 RX ORDER — PROMETHAZINE HYDROCHLORIDE 25 MG/ML
12.5 INJECTION, SOLUTION INTRAMUSCULAR; INTRAVENOUS EVERY 6 HOURS PRN
Status: DISCONTINUED | OUTPATIENT
Start: 2024-09-09 | End: 2024-09-10 | Stop reason: HOSPADM

## 2024-09-09 RX ADMIN — OXYCODONE HYDROCHLORIDE 10 MG: 10 TABLET ORAL at 02:06

## 2024-09-09 RX ADMIN — HYDROMORPHONE HYDROCHLORIDE 0.5 MG: 1 INJECTION, SOLUTION INTRAMUSCULAR; INTRAVENOUS; SUBCUTANEOUS at 03:13

## 2024-09-09 RX ADMIN — CEFAZOLIN SODIUM 2000 MG: 2 SOLUTION INTRAVENOUS at 12:17

## 2024-09-09 RX ADMIN — OXYCODONE HYDROCHLORIDE 5 MG: 5 TABLET ORAL at 18:24

## 2024-09-09 RX ADMIN — ONDANSETRON 4 MG: 2 INJECTION INTRAMUSCULAR; INTRAVENOUS at 02:09

## 2024-09-09 RX ADMIN — MORPHINE SULFATE 2 MG: 2 INJECTION, SOLUTION INTRAMUSCULAR; INTRAVENOUS at 00:14

## 2024-09-09 RX ADMIN — CEFAZOLIN SODIUM 2000 MG: 2 SOLUTION INTRAVENOUS at 03:15

## 2024-09-09 RX ADMIN — NAPROXEN 250 MG: 250 TABLET ORAL at 16:50

## 2024-09-09 RX ADMIN — PANTOPRAZOLE SODIUM 40 MG: 40 TABLET, DELAYED RELEASE ORAL at 08:52

## 2024-09-09 RX ADMIN — INSULIN LISPRO 1 UNITS: 100 INJECTION, SOLUTION INTRAVENOUS; SUBCUTANEOUS at 06:30

## 2024-09-09 RX ADMIN — HYDROMORPHONE HYDROCHLORIDE 0.5 MG: 1 INJECTION, SOLUTION INTRAMUSCULAR; INTRAVENOUS; SUBCUTANEOUS at 20:20

## 2024-09-09 RX ADMIN — LISINOPRIL 10 MG: 10 TABLET ORAL at 08:52

## 2024-09-09 RX ADMIN — ENOXAPARIN SODIUM 40 MG: 40 INJECTION SUBCUTANEOUS at 08:52

## 2024-09-09 RX ADMIN — HYDROMORPHONE HYDROCHLORIDE 0.5 MG: 1 INJECTION, SOLUTION INTRAMUSCULAR; INTRAVENOUS; SUBCUTANEOUS at 08:52

## 2024-09-09 RX ADMIN — ATORVASTATIN CALCIUM 20 MG: 20 TABLET, FILM COATED ORAL at 16:50

## 2024-09-09 RX ADMIN — INSULIN LISPRO 3 UNITS: 100 INJECTION, SOLUTION INTRAVENOUS; SUBCUTANEOUS at 18:27

## 2024-09-09 RX ADMIN — ACETAMINOPHEN 975 MG: 325 TABLET ORAL at 22:40

## 2024-09-09 RX ADMIN — HYDROMORPHONE HYDROCHLORIDE 0.5 MG: 1 INJECTION, SOLUTION INTRAMUSCULAR; INTRAVENOUS; SUBCUTANEOUS at 12:21

## 2024-09-09 RX ADMIN — ACETAMINOPHEN 975 MG: 325 TABLET ORAL at 13:40

## 2024-09-09 RX ADMIN — ACETAMINOPHEN 650 MG: 325 TABLET ORAL at 08:52

## 2024-09-09 RX ADMIN — HYDROMORPHONE HYDROCHLORIDE 0.5 MG: 1 INJECTION, SOLUTION INTRAMUSCULAR; INTRAVENOUS; SUBCUTANEOUS at 06:28

## 2024-09-09 RX ADMIN — Medication 2.5 MG: at 16:49

## 2024-09-09 RX ADMIN — INSULIN LISPRO 1 UNITS: 100 INJECTION, SOLUTION INTRAVENOUS; SUBCUTANEOUS at 12:17

## 2024-09-09 NOTE — PLAN OF CARE
Problem: Prexisting or High Potential for Compromised Skin Integrity  Goal: Skin integrity is maintained or improved  Description: INTERVENTIONS:  - Identify patients at risk for skin breakdown  - Assess and monitor skin integrity  - Assess and monitor nutrition and hydration status  - Monitor labs   - Assess for incontinence   - Turn and reposition patient  - Assist with mobility/ambulation  - Relieve pressure over bony prominences  - Avoid friction and shearing  - Provide appropriate hygiene as needed including keeping skin clean and dry  - Evaluate need for skin moisturizer/barrier cream  - Collaborate with interdisciplinary team   - Patient/family teaching  - Consider wound care consult   Outcome: Progressing     Problem: SKIN/TISSUE INTEGRITY - ADULT  Goal: Incision(s), wounds(s) or drain site(s) healing without S/S of infection  Description: INTERVENTIONS  - Assess and document dressing, incision, wound bed, drain sites and surrounding tissue  - Provide patient and family education  - Perform skin care/dressing changes per order  Outcome: Progressing     Problem: HEMATOLOGIC - ADULT  Goal: Maintains hematologic stability  Description: INTERVENTIONS  - Assess for signs and symptoms of bleeding or hemorrhage  - Monitor labs  - Administer supportive blood products/factors as ordered and appropriate  Outcome: Progressing     Problem: MUSCULOSKELETAL - ADULT  Goal: Maintain or return mobility to safest level of function  Description: INTERVENTIONS:  - Assess patient's ability to carry out ADLs; assess patient's baseline for ADL function and identify physical deficits which impact ability to perform ADLs (bathing, care of mouth/teeth, toileting, grooming, dressing, etc.)  - Assess/evaluate cause of self-care deficits   - Assess range of motion  - Assess patient's mobility  - Assess patient's need for assistive devices and provide as appropriate  - Encourage maximum independence but intervene and supervise when  necessary  - Involve family in performance of ADLs  - Assess for home care needs following discharge   - Consider OT consult to assist with ADL evaluation and planning for discharge  - Provide patient education as appropriate  Outcome: Progressing  Goal: Maintain proper alignment of affected body part  Description: INTERVENTIONS:  - Support, maintain and protect limb and body alignment  - Provide patient/ family with appropriate education  Outcome: Progressing

## 2024-09-09 NOTE — ASSESSMENT & PLAN NOTE
Seondary to tibial plateau fracture Swelling noted along the prixam tibia RT side Tender to palpation  Intact sensation,ROM with limitations, pulses.     9/9 with continue RT LE pain . No tingling/numbness/change in toe color/pulses palpable.   Orthopedics considering compartment syndrome       Plan:   Orthopedics team following ; patient is back on NPO may be taken to OR for compartment syndrome ??   Pain regimen as documented  Tylenol scheduled   Interval follow up

## 2024-09-09 NOTE — ANESTHESIA POSTPROCEDURE EVALUATION
Post-Op Assessment Note    CV Status:  Stable  Pain Score: 0    Pain management: adequate       Mental Status:  Sleepy and arousable   Hydration Status:  Euvolemic   PONV Controlled:  Controlled   Airway Patency:  Patent     Post Op Vitals Reviewed: Yes    No anethesia notable event occurred.    Staff: Anesthesiologist, CRNA               /74 (09/08/24 2152)    Temp 99.1 °F (37.3 °C) (09/08/24 2152)    Pulse 99 (09/08/24 2152)   Resp 14 (09/08/24 2152)    SpO2 94 % (09/08/24 2152)

## 2024-09-09 NOTE — PROGRESS NOTES
"Progress Note - Orthopedics   Asa Deng 48 y.o. male MRN: [id number]  Unit/Bed#: 2 [place] [address] Encounter: [id number]        Subjective:POD #1 status post open reduction internal fixation of bicondylar right tibial plateau fracture and open reduction internal fixation of right tibial tubercle/anterior right tibial shaft fracture. The patient notes that his pain has not been well controlled, though pain medication does help some. He notes that he had some numbness about the foot yesterday, but this morning notes good sensation of the right lower extremity. He denies any CP, SOB, N/V, dizziness. No acute overnight events.     Vitals: Blood pressure 140/87, pulse 96, temperature 98.5 °F (36.9 °C), temperature source Oral, resp. rate 19, height 5' 7\" (1.702 m), weight 97 kg (213 lb 13.5 oz), SpO2 97%.,Body mass index is 33.49 kg/m².      Intake/Output Summary (Last 24 hours) at 9/9/2024 0827  Last data filed at 9/9/2024 0620  Gross per 24 hour   Intake 1000 ml   Output 1850 ml   Net -850 ml       Invasive Devices       Peripheral Intravenous Line  Duration             Peripheral IV 09/08/24 Left Antecubital <1 day    Peripheral IV 09/08/24 Left Hand <1 day                      Ortho Exam: Patient alert and oriented X 3 in NAD in mid discomfort. RLE: Dressing CDI. Swelling lower leg, most significant about the lateral lower leg, but all compartments soft today.  Patient able to actively move toes and ankle. Minimal discomfort with passive ROM of ankle. Sensation intact. 2+ DP.     Lab, Imaging and other studies: I have personally reviewed pertinent lab results.  CBC:   Lab Results   Component Value Date    WBC 13.47 (H) 09/09/2024    HGB 14.6 09/09/2024    HCT 43.0 09/09/2024    MCV 87 09/09/2024     09/09/2024    RBC 4.95 09/09/2024    MCH 29.5 09/09/2024    MCHC 34.0 09/09/2024    RDW 13.0 09/09/2024    MPV 10.3 09/09/2024    NRBC 0 09/09/2024     CMP:   Lab Results   Component Value Date     " 09/09/2024    CO2 19 (L) 09/09/2024    BUN 17 09/09/2024    CREATININE 0.96 09/09/2024    CALCIUM 8.5 09/09/2024    AST 36 09/09/2024    ALT 36 09/09/2024    ALKPHOS 43 09/09/2024    EGFR 94 09/09/2024     PT/INR:   Lab Results   Component Value Date    INR 0.95 09/08/2024       Assessment:  POD #1 status post open reduction internal fixation of bicondylar right tibial plateau fracture and open reduction internal fixation of right tibial tubercle/anterior right tibial shaft fracture    Plan:  The patient does have ongoing pain about the leg, though no signs of compartment syndrome today. We discussed the signs/symptoms or compartment symptoms. He will notify nursing staff immediately if any increase in pain, numbness of the leg, or inability to move the foot/ankle. For now we will continue analgesia prn. NWB RLE. Lovenox for DVT prophylaxis. Post abx X 24 hours. We will monitor the patient closely for compartment syndrome.       VTE Pharmacologic Prophylaxis: Enoxaparin (Lovenox)  VTE Mechanical Prophylaxis: sequential compression device

## 2024-09-09 NOTE — ASSESSMENT & PLAN NOTE
How Severe Are Your Spot(S)?: moderate Tachycardia, leukocytosis  No source of infection    Mostly secondary to recent ORIF on 9/8 and RT LE pain     -interval follow up with labs    Hpi Title: Evaluation of Skin Lesions

## 2024-09-09 NOTE — OP NOTE
OPERATIVE REPORT  PATIENT NAME: Asa Deng    :  1975  MRN: [id number]  Pt Location: [facility] OR ROOM 03    SURGERY DATE: 2024    Surgeons and Role:     * Mando Canales MD - Primary     * Harris Braun PA-C - Assisting    Preop Diagnosis:  Closed displaced fracture of lateral condyle of right tibia, initial encounter [S82.121A]    Post-Op Diagnosis Codes:     * Closed displaced fracture of lateral condyle of right tibia, initial encounter [S82.121A]    Procedure(s):  Right - OPEN REDUCTION W/ INTERNAL FIXATION (ORIF) TIBIA    Specimen(s):  * No specimens in log *    Estimated Blood Loss:   100 mL    Drains:  * No LDAs found *    Anesthesia Type:   General    Operative Indications:  Closed displaced fracture of lateral condyle of right tibia, initial encounter [S82.121A]  Closed displaced right tibial tubercle and anterior shaft fracture    Operative Findings:  Same as above    Complications:   None    Procedure and Technique:  Open reduction internal fixation of bicondylar right tibial plateau fracture and open reduction internal fixation of right tibial tubercle/anterior right tibial shaft fracture    After informed surgical consent obtained patient was brought to the operating room and administered general anesthesia.  He was then placed in the supine position on the operating room table and all cushions and restraints were placed.  Right lower extremity was prepped and draped normal sterile fashion.    Using the standard technique a Schanz pin was placed through the distal femur 1 was placed in the distal tibia providing traction across his knee joint once the universal distractor was applied.    The anterior lateral approach was undertaken and standard dissection was taken down through the skin and subcutaneous tissues to the level of the proximal tibia.  The lateral wall was opened and the impacted articular fragments were identified.  These were elevated in mass up to their normal position and  held with a Brigitte wire.  The lateral wall was closed and then a 3.5 mm raft screw with a washer was placed to hold up this articular fragment.    The anterior tibial shaft fracture along with a tibial tubercle fracture were subsequently reduced to an anatomic position and held with an antiglide one third tubular plate distally and then with 3.5 mm lag screws more proximally.    The tibial plateau locking plate was then applied to the tibia along the lateral side and secured with 3.5 mm locking screws proximally and 3.5 mm cortical screws distally.    Final fluoroscopic views were obtained.  Is examination demonstrated that he was swollen but he did not appear to be in any way a compartment syndrome at this time.  We are going to monitor him throughout the night and tomorrow however he does not at this point in time look at all like he needs fasciotomies or if that he has a compartment syndrome.    Powder vancomycin was placed in the wound.  The wound was closed with 2-0 Vicryl and nylon.  Sterile dressings were applied.  The universal distractor have been removed.  Patient was then transferred to recovery stable condition having tolerated the procedure well. I was present for the entire procedure, a qualified resident physician was not available, and a physician assistant was required during the procedure for retraction, tissue handling, dissection and suturing.    Patient Disposition:  PACU     SIGNATURE: Mando Canales MD  DATE: September 8, 2024  TIME: 9:26 PM

## 2024-09-09 NOTE — OCCUPATIONAL THERAPY NOTE
Occupational Therapy Cancellation     Patient Name: Asa Deng  Today's Date: 9/9/2024  Problem List  Principal Problem:    Acute pain of right knee  Active Problems:    Type 2 diabetes mellitus without complication, without long-term current use of insulin (HCC)    Primary hypertension    Closed fracture of right tibial plateau    Obesity    Fall    Past Medical History  Past Medical History:   Diagnosis Date    Diabetes mellitus (HCC)     Hypertension      Past Surgical History  Past Surgical History:   Procedure Laterality Date    ORIF TIBIA FRACTURE Right 9/8/2024    Procedure: OPEN REDUCTION W/ INTERNAL FIXATION (ORIF) TIBIA;  Surgeon: Mando Canales MD;  Location: [facility];  Service: Orthopedics        09/09/24 0941   Note Type   Note type Cancelled Session  (Monday 9/9/24)   Cancel Reasons Other   Additional Comments OT orders received and chart review completed. Spoke w/ RN and coordinated care w/ PTJoan. RN reports pt w/ increased pain and concern for compartment syndrome. Will cancel and continue to follow   Licensure   [place] License Number  Urmila Rizzo, OTR/L [id number]          Urmila Rizzo, OTR/L  [id number]  [id number]

## 2024-09-09 NOTE — PLAN OF CARE
Problem: OCCUPATIONAL THERAPY ADULT  Goal: Performs self-care activities at highest level of function for planned discharge setting.  See evaluation for individualized goals.  Description: Treatment Interventions: ADL retraining, Functional transfer training, UE strengthening/ROM, Endurance training, Patient/family training, Equipment evaluation/education, Compensatory technique education, Energy conservation, Activityengagement, Continued evaluation          See flowsheet documentation for full assessment, interventions and recommendations.   Note: Limitation: Decreased ADL status, Decreased endurance, Decreased self-care trans, Decreased high-level ADLs (impaired pain, standing tolerance, balance, forward functional reach, R LE ROM /strength)     Assessment: Pt is a 49yo male [facility] 9/8/24 following a fall at work. Diagnosed w/ acute pain R knee, R tibial plateau fracture. Pt is s/p ORIF R tibia on 9/8/24 and is NWB R LE. Per orthopedics ROM (full extension, flexion appropriate ) w/ slow progression. Pt reports I w/ ADL/ IADL at baseline at home in 2 SH w/ wife and 14yo son. Significant PMH impacting his occupational performance includes HTN, DM, R biceps tendon repair (2016). Personal and environmental factors supporting performance includes independent at baseline, supportive family, able to maintain first floor set- up; barriers include multi level home, pain. Upon eval pt alert and oriented. Able to follow directions and communicate wants / needs. Pt required mod I grooming seated, S UBD, mod A LBD, min A bed mobility, min A sit <> stand and min A using RW for short distance functional mobility. Pt is completing ADLs below baseline level of I and would benefit from OT to achieve highest level of function. Recommend level I rehab resource intensity when medically stable for discharge from acute care. Will continue to follow     Rehab Resource Intensity Level, OT: I (Maximum Resource Intensity)

## 2024-09-09 NOTE — ASSESSMENT & PLAN NOTE
XR: Schatzker II plateau fracture  . Interpreted by Dr. Munroe  ( orthopedics)   Swelling noted along the prixam tibia RT side   No erythema.  Tender to palpation    Intact fine touch sensory in the lower extremities bilaterally   Intact ROM at ankle/foot bilaterally   DP/PT: 2+        S/p ORIF on 9/8/2024 with successful reduction and fixation per orthopedics.     Plan:   Pain management per protocol   Am labs  Bed rest   Appreciate further recommendations from Orthopedics   Interval follow up   PTOT as permissible in am per orthopedics recs

## 2024-09-09 NOTE — OCCUPATIONAL THERAPY NOTE
Occupational Therapy Evaluation     Patient Name: Asa Deng  Today's Date: 9/9/2024  Problem List  Principal Problem:    Acute pain of right knee  Active Problems:    Type 2 diabetes mellitus without complication, without long-term current use of insulin (HCC)    Primary hypertension    Closed fracture of right tibial plateau    Obesity    Fall    SIRS (systemic inflammatory response syndrome) (HCC)    Past Medical History  Past Medical History:   Diagnosis Date    Diabetes mellitus (HCC)     Hypertension      Past Surgical History  Past Surgical History:   Procedure Laterality Date    ORIF TIBIA FRACTURE Right 9/8/2024    Procedure: OPEN REDUCTION W/ INTERNAL FIXATION (ORIF) TIBIA;  Surgeon: Mando Canales MD;  Location: [facility];  Service: Orthopedics        09/09/24 1437   OT Last Visit   OT Visit Date 09/09/24  (Monday)   Note Type   Note type Evaluation   Additional Comments Identified pt by full name and birthdate   Pain Assessment   Pain Assessment Tool 0-10   Pain Score 4   Pain Location/Orientation Orientation: Right;Location: Leg  (seated OOB in bedside recliner chair post eval)   Effect of Pain on Daily Activities limits pace and activity tolerance, I w/ ADLs   Patient's Stated Pain Goal No pain   Hospital Pain Intervention(s) Repositioned;Cold applied;Ambulation/increased activity;Emotional support;Elevated  (RN medicated prior to eval)   Restrictions/Precautions   Weight Bearing Precautions Per Order Yes   RLE Weight Bearing Per Order NWB  (s/p ORIF R Tibia; ROM w/ slow progression per orthopedics)   Braces or Orthoses Splint  (ace bandage / splint / dressing R LE)   Other Precautions Bed Alarm;WBS;Fall Risk;Pain   Home Living   Type of Home House   Home Layout Two level;Bed/bath upstairs;1/2 bath on main level  (2 RONNI)   Bathroom Shower/Tub Tub/shower unit   Bathroom Toilet Standard  (raised toilet on 2nd floor; standard toilet in first floor)   Bathroom Equipment Other (Comment)  (no DME)  "  Bathroom Accessibility Accessible  (small bathroom; pt reports may not be accessible)   Home Equipment   (no DME or AD)   Additional Comments Pt reports I w/ ADL/ IADL at baseline   Prior Function   Level of Miner Independent with ADLs;Independent with functional mobility;Independent with IADLS   Lives With Spouse;Son;Other (Comment)  (dogs; 15 yo son)   IADLs Independent with driving;Independent with meal prep;Independent with medication management   Falls in the last 6 months 1 to 4  (1 traumatic leading to admission)   Vocational Full time employment   Comments Pt reports I w/ ADL/ IADL at home w/ wife and 14yo son   Lifestyle   Autonomy Pt reports I and active lifestyle at baseline w/ out use of AD or DME   Reciprocal Relationships Pt reports living w/ wife and their 15 yo son   Service to Others 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 Pt reports having 2 [place]ic shepherds   General   Additional Pertinent History Pt is s/p   Family/Caregiver Present No   Additional General Comments Per orthopedics, pt is NWB R LE   Subjective   Subjective \"I do not want to mve too much\"   ADL   Where Assessed Edge of bed  (vs OOB in chair post eval)   Eating Assistance 7  Independent   Grooming Assistance 6  Modified Independent   Grooming Deficit Setup;Increased time to complete  (seated due to decresaed activity / standing tolerance)   UB Bathing Assistance Unable to assess  (anticipate mod I seated after set- up based on fxal obs skills, clinical judgement)   LB Bathing Assistance Unable to assess  (anticipate min A based on fxal obs skills, clinical judgement)   UB Dressing Assistance 5  Supervision/Setup   UB Dressing Deficit Setup;Supervision/safety;Verbal cueing;Increased time to complete   LB Dressing Assistance 3  Moderate Assistance   LB Dressing Deficit Thread RLE into pants;Pull up over hips;Setup;Steadying;Increased " time to complete;Verbal cueing;Supervision/safety  (supine in bed via bridging, weight shift to manage underwear up / over hips)   Toileting Assistance  Unable to assess   Bed Mobility   Supine to Sit 4  Minimal assistance   Additional items Assist x 1;Bedrails;Increased time required;LE management;Verbal cues  (to pt's L)   Sit to Supine Unable to assess   Additional Comments Pt seated OOB in chair post eval w/ needs met, call bell in reach   Transfers   Sit to Stand 4  Minimal assistance   Additional items Assist x 1;Increased time required;Verbal cues   Stand to Sit 4  Minimal assistance   Additional items Assist x 1;Increased time required;Verbal cues;Bedrails;Armrests  (R LE mgmt)   Additional Comments Pt tolerated sitting at EOB 5 minutes w/ fair balance. Pt prefers R LE supported on edge of mattress / bed.   Functional Mobility   Functional Mobility 4  Minimal assistance   Additional Comments short distances using RW   Additional items Rolling walker   Balance   Static Sitting Fair  (w/ R LE supported on EOB)   Static Standing Fair -   Ambulatory Fair -  (using RW)   Activity Tolerance   Activity Tolerance Patient limited by pain;Patient limited by fatigue   Medical Staff Made Aware care coordination w/ PTJoan due to decreased activity tolerance, regression from baseline. Spoke w/ Catarina OMALLEY   Nurse Made Aware spoke w/ Tracee LEW   RUE Assessment   RUE Assessment   (limited end / terminal ROM elbow, limited ROM supination (premorbid))   RUE Strength   RUE Overall Strength Within Functional Limits - able to perform ADL tasks with strength   LUE Assessment   LUE Assessment WFL   LUE Strength   LUE Overall Strength Within Functional Limits - able to perform ADL tasks with strength   Hand Function   Gross Motor Coordination Functional   Fine Motor Coordination Functional   Hand Function Comments Pt reports R hand dominance   Sensation   Light Touch No apparent deficits   Cognition   Overall Cognitive Status WFL    Arousal/Participation Alert;Cooperative   Attention Within functional limits   Orientation Level Oriented X4   Memory Within functional limits   Following Commands Follows all commands and directions without difficulty   Comments Alert, oriented and able to follow directions during ADLs   Assessment   Limitation Decreased ADL status;Decreased endurance;Decreased self-care trans;Decreased high-level ADLs  (impaired pain, standing tolerance, balance, forward functional reach, R LE ROM /strength)   Assessment Pt is a 47yo male admitted to [facility] on 9/8/24 following a fall at work. Diagnosed w/ acute pain R knee, R tibial plateau fracture. Pt is s/p ORIF R tibia on 9/8/24 and is NWB R LE. Per orthopedics ROM (full extension, flexion appropriate ) w/ slow progression. Pt reports I w/ ADL/ IADL at baseline at home in 2 SH w/ wife and 14yo son. Significant PMH impacting his occupational performance includes HTN, DM, R biceps tendon repair (2016). Personal and environmental factors supporting performance includes independent at baseline, supportive family, able to maintain first floor set- up; barriers include multi level home, pain. Upon eval pt alert and oriented. Able to follow directions and communicate wants / needs. Pt required mod I grooming seated, S UBD, mod A LBD, min A bed mobility, min A sit <> stand and min A using RW for short distance functional mobility. Pt is completing ADLs below baseline level of I and would benefit from OT to achieve highest level of function. Recommend level I rehab resource intensity when medically stable for discharge from acute care. Will continue to follow   Goals   Patient Goals Pt stated that he wants to get home   LTG Time Frame 7-10   Long Term Goal #1 see below   Plan   Treatment Interventions ADL retraining;Functional transfer training;UE strengthening/ROM;Endurance training;Patient/family training;Equipment evaluation/education;Compensatory technique education;Energy  conservation;Activityengagement;Continued evaluation   Goal Expiration Date 09/19/24   OT Treatment Day 0  (Monday 9/9/24)   OT Frequency 3-5x/wk   Discharge Recommendation   Rehab Resource Intensity Level, OT I (Maximum Resource Intensity)   AM-PAC Daily Activity Inpatient   Lower Body Dressing 2   Bathing 2   Toileting 3   Upper Body Dressing 4   Grooming 4   Eating 4   Daily Activity Raw Score 19   Daily Activity Standardized Score (Calc for Raw Score >=11) 40.22   -PAC Applied Cognition Inpatient   Following a Speech/Presentation 4   Understanding Ordinary Conversation 4   Taking Medications 4   Remembering Where Things Are Placed or Put Away 4   Remembering List of 4-5 Errands 4   Taking Care of Complicated Tasks 4   Applied Cognition Raw Score 24   Applied Cognition Standardized Score 62.21   Barthel Index   Feeding 10   Bathing 0   Grooming Score 5   Dressing Score 5   Bladder Score 10   Bowels Score 10   Toilet Use Score 5   Transfers (Bed/Chair) Score 10   Mobility (Level Surface) Score 0   Stairs Score 0   Barthel Index Score 55   End of Consult   Education Provided Yes   Patient Position at End of Consult Bedside chair;All needs within reach   Nurse Communication Nurse aware of consult   Licensure   [place] License Number  Urmila Rizzo, OTR/L [id number]         The patient's raw score on the AM-PAC Daily Activity Inpatient Short Form is 19. A raw score of greater than or equal to 19 suggests the patient may benefit from discharge to home. Please refer to the recommendation of the Occupational Therapist for safe discharge planning.    Pt goals to be met by 9/19/24 to max I w/ ADLs and improve engagement to return home to [facility], baseline level of I to take care of his dogs includes:    -Pt will complete bed mobility supine <> sit w/ mod I for + time to improve activity engagement    -Pt will complete functional transfers to all surfaces w/ mod I using LRAD, DME as needed    -Pt will demonstrate good  attention and understanding activity restrictions during ADLs to max I and improve engagement    -Pt will complete UBD independently    -Pt will complete LBD w/ S using LHAE as needed    -Pt will consistently engage in functional mobility using LRAD to / from bathroom demonstrating good recall R LE NWB    -Pt will demonstrate improved AMPAC score to at least 23 to max I w/ ADLs and assist in DC Planning    Urmila Rizzo, OTR/L  [id number]  [id number]

## 2024-09-09 NOTE — PHYSICAL THERAPY NOTE
PT Cancellation   09/09/24 0940   PT Last Visit   PT Visit Date 09/09/24   Note Type   Note type Cancelled Session   Additional Comments PT order received, chart reviewed, however pt continues to be in pain ; per RN, pt is now NPO to rule out compartment syndrome, will defer evalutation at this time and check status later in day.   Licensure   [place] License Number  Amanda Cortez PT  [id number]

## 2024-09-09 NOTE — PROGRESS NOTES
"Progress Note - Orthopedics   Asa Deng 48 y.o. male MRN: [id number]  Unit/Bed#: 2 [place] [address] Encounter: [id number]        Subjective: POD #1 status post open reduction internal fixation of bicondylar right tibial plateau fracture and open reduction internal fixation of right tibial tubercle/anterior right tibial shaft fracture. Patient rechecked this afternoon. Pain better controlled and is currently 5/10. He continues to note good sensation of the right lower extremity.     Vitals: Blood pressure 140/87, pulse 96, temperature 98.5 °F (36.9 °C), temperature source Oral, resp. rate 19, height 5' 7\" (1.702 m), weight 97 kg (213 lb 13.5 oz), SpO2 97%.,Body mass index is 33.49 kg/m².      Intake/Output Summary (Last 24 hours) at 9/9/2024 1338  Last data filed at 9/9/2024 0859  Gross per 24 hour   Intake 1000 ml   Output 2540 ml   Net -1540 ml       Invasive Devices       Peripheral Intravenous Line  Duration             Peripheral IV 09/08/24 Left Antecubital <1 day    Peripheral IV 09/08/24 Left Hand <1 day                        Ortho Exam: Patient alert and oriented X 3 in NAD in mid discomfort. RLE: Dressing CDI. Swelling lower leg, but all compartments soft today.  Patient able to actively move toes and ankle. Minimal discomfort with passive ROM of ankle. Sensation intact. 2+ DP pulse. Good capillary refill.      Lab, Imaging and other studies: I have personally reviewed pertinent lab results.  CBC:   Lab Results   Component Value Date    WBC 13.47 (H) 09/09/2024    HGB 14.6 09/09/2024    HCT 43.0 09/09/2024    MCV 87 09/09/2024     09/09/2024    RBC 4.95 09/09/2024    MCH 29.5 09/09/2024    MCHC 34.0 09/09/2024    RDW 13.0 09/09/2024    MPV 10.3 09/09/2024    NRBC 0 09/09/2024     CMP:   Lab Results   Component Value Date     09/09/2024    CO2 19 (L) 09/09/2024    BUN 17 09/09/2024    CREATININE 0.96 09/09/2024    CALCIUM 8.5 09/09/2024    AST 36 09/09/2024    ALT 36 09/09/2024    ALKPHOS 43 " 09/09/2024    EGFR 94 09/09/2024     PT/INR:   Lab Results   Component Value Date    INR 0.95 09/08/2024       Assessment:  POD #1 status post open reduction internal fixation of bicondylar right tibial plateau fracture and open reduction internal fixation of right tibial tubercle/anterior right tibial shaft fracture.    Plan:  Patients pain is improved this afternoon, with out signs of symptoms of compartment syndrome. He can eat at this point. Will continue to follow the patient closely .

## 2024-09-09 NOTE — CASE MANAGEMENT
Case Management Assessment & Discharge Planning Note    Patient name Asa Deng  Location 2 South 214/2 South 214 MRN [id number]  : 1975 Date 2024       Current Admission Date: 2024  Current Admission Diagnosis:Acute pain of right knee   Patient Active Problem List    Diagnosis Date Noted Date Diagnosed    SIRS (systemic inflammatory response syndrome) ([facility]) 2024     Type 2 diabetes mellitus without complication, without long-term current use of insulin ([facility]) 2024     Primary hypertension 2024     Acute pain of right knee 2024     Closed fracture of right tibial plateau 2024     Obesity 2024     Fall 2024       LOS (days): 1  Geometric Mean LOS (GMLOS) (days):   Days to GMLOS:     OBJECTIVE:            Current admission status: Observation     Preferred Pharmacy:   [id number] - [place] [address] [address]  Phone: [phone] Fax: [phone]    Primary Care Provider: Henry Denny    Primary Insurance: [employer]ENSATION  Secondary Insurance: [facility]    ASSESSMENT:  Active Health Care Proxies    There are no active Health Care Proxies on file.       Readmission Root Cause  30 Day Readmission: No    Patient Information  Admitted from:: Home  Mental Status: Alert  During Assessment patient was accompanied by: Other-Comment (coworker)  Assessment information provided by:: Patient  Primary Caregiver: Self  Support Systems: Self, Spouse/significant other, Son  Type of Current Residence: 2 [place] home  Upon entering residence, is there a bedroom on the main floor (no further steps)?: Yes  Upon entering residence, is there a bathroom on the main floor (no further steps)?:  (1/2 bathroom on first level)  Living Arrangements: Lives w/ Spouse/significant other, Lives w/ Son    Activities of Daily Living Prior to Admission  Functional Status: Independent  Completes ADLs independently?:  Yes  Ambulates independently?: Yes  Does patient use assisted devices?: No  Does patient currently own DME?: No  Does the patient have a history of Short-Term Rehab?: No  Does patient have a history of HHC?: No  Does patient currently have HHC?: No     Patient Information Continued  Income Source: Employed  Does patient have prescription coverage?: Yes  Does patient receive dialysis treatments?: No     Means of Transportation  Means of Transport to [facility]:: Drives Self    DISCHARGE DETAILS:    Discharge planning discussed with:: Patient  Freedom of Choice: Yes  Comments - Freedom of Choice: Discussed therapy recs for acute rehab, patient voiced preference to go home as soon as possible        Requested Home Health Care         Is the patient interested in HHC at discharge?: No     Other Referral/Resources/Interventions Provided:  Interventions: None Indicated  Referral Comments: RN CM spoke with patient at bedside to introduce self and role and discuss discharge planning. Discussed therapy recs for acute rehab and patient refused. He voiced preference to go home. Discussed safety and home and required dme and assistance. Patient stated he will not need a walker but crutches will be helpful. He also stated he has a very supportive family at home and his wife and son will be able to help him. RN CM informed patient that therapy will be made aware for his request for crutches and CM assistance remains available until discharge. PT Amanda and OT Urmila made aware of patient's request for crutches.     Treatment Team Recommendation: Acute Rehab  Discharge Destination Plan:: Home  Transport at Discharge : Family
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
## 2024-09-09 NOTE — ASSESSMENT & PLAN NOTE
"No results found for: \"HGBA1C\"    Recent Labs     09/08/24  1856 09/09/24  1158   POCGLU 162* 161*       Blood Sugar Average: Last 72 hrs:  (P) 161.59/5/2024  HBA1C: 6.8       SSI with accu checks       "
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
## 2024-09-10 VITALS
TEMPERATURE: 99 F | HEIGHT: 67 IN | SYSTOLIC BLOOD PRESSURE: 119 MMHG | OXYGEN SATURATION: 95 % | DIASTOLIC BLOOD PRESSURE: 71 MMHG | HEART RATE: 98 BPM | RESPIRATION RATE: 16 BRPM | BODY MASS INDEX: 33.56 KG/M2 | WEIGHT: 213.85 LBS

## 2024-09-10 LAB
ANION GAP SERPL CALCULATED.3IONS-SCNC: 9 MMOL/L (ref 4–13)
BASOPHILS # BLD AUTO: 0.03 THOUSANDS/ÂΜL (ref 0–0.1)
BASOPHILS NFR BLD AUTO: 0 % (ref 0–1)
BUN SERPL-MCNC: 25 MG/DL (ref 5–25)
CALCIUM SERPL-MCNC: 8.5 MG/DL (ref 8.4–10.2)
CHLORIDE SERPL-SCNC: 99 MMOL/L (ref 96–108)
CO2 SERPL-SCNC: 25 MMOL/L (ref 21–32)
CREAT SERPL-MCNC: 0.89 MG/DL (ref 0.6–1.3)
DME PARACHUTE DELIVERY DATE ACTUAL: NORMAL
DME PARACHUTE DELIVERY DATE REQUESTED: NORMAL
DME PARACHUTE ITEM DESCRIPTION: NORMAL
DME PARACHUTE ORDER STATUS: NORMAL
DME PARACHUTE SUPPLIER NAME: NORMAL
DME PARACHUTE SUPPLIER PHONE: NORMAL
EOSINOPHIL # BLD AUTO: 0.07 THOUSAND/ÂΜL (ref 0–0.61)
EOSINOPHIL NFR BLD AUTO: 1 % (ref 0–6)
ERYTHROCYTE [DISTWIDTH] IN BLOOD BY AUTOMATED COUNT: 13 % (ref 11.6–15.1)
GFR SERPL CREATININE-BSD FRML MDRD: 102 ML/MIN/1.73SQ M
GLUCOSE P FAST SERPL-MCNC: 154 MG/DL (ref 65–99)
GLUCOSE SERPL-MCNC: 147 MG/DL (ref 65–140)
GLUCOSE SERPL-MCNC: 154 MG/DL (ref 65–140)
GLUCOSE SERPL-MCNC: 174 MG/DL (ref 65–140)
GLUCOSE SERPL-MCNC: 199 MG/DL (ref 65–140)
HCT VFR BLD AUTO: 38 % (ref 36.5–49.3)
HGB BLD-MCNC: 12.7 G/DL (ref 12–17)
IMM GRANULOCYTES # BLD AUTO: 0.02 THOUSAND/UL (ref 0–0.2)
IMM GRANULOCYTES NFR BLD AUTO: 0 % (ref 0–2)
LYMPHOCYTES # BLD AUTO: 2.54 THOUSANDS/ÂΜL (ref 0.6–4.47)
LYMPHOCYTES NFR BLD AUTO: 28 % (ref 14–44)
MAGNESIUM SERPL-MCNC: 2.2 MG/DL (ref 1.9–2.7)
MCH RBC QN AUTO: 29.2 PG (ref 26.8–34.3)
MCHC RBC AUTO-ENTMCNC: 33.4 G/DL (ref 31.4–37.4)
MCV RBC AUTO: 87 FL (ref 82–98)
MONOCYTES # BLD AUTO: 1.31 THOUSAND/ÂΜL (ref 0.17–1.22)
MONOCYTES NFR BLD AUTO: 15 % (ref 4–12)
NEUTROPHILS # BLD AUTO: 4.99 THOUSANDS/ÂΜL (ref 1.85–7.62)
NEUTS SEG NFR BLD AUTO: 56 % (ref 43–75)
NRBC BLD AUTO-RTO: 0 /100 WBCS
PLATELET # BLD AUTO: 177 THOUSANDS/UL (ref 149–390)
PMV BLD AUTO: 10.5 FL (ref 8.9–12.7)
POTASSIUM SERPL-SCNC: 3.8 MMOL/L (ref 3.5–5.3)
RBC # BLD AUTO: 4.35 MILLION/UL (ref 3.88–5.62)
SODIUM SERPL-SCNC: 133 MMOL/L (ref 135–147)
WBC # BLD AUTO: 8.96 THOUSAND/UL (ref 4.31–10.16)

## 2024-09-10 PROCEDURE — 85025 COMPLETE CBC W/AUTO DIFF WBC: CPT

## 2024-09-10 PROCEDURE — 99239 HOSP IP/OBS DSCHRG MGMT >30: CPT | Performed by: INTERNAL MEDICINE

## 2024-09-10 PROCEDURE — 83735 ASSAY OF MAGNESIUM: CPT

## 2024-09-10 PROCEDURE — 97535 SELF CARE MNGMENT TRAINING: CPT

## 2024-09-10 PROCEDURE — 97110 THERAPEUTIC EXERCISES: CPT

## 2024-09-10 PROCEDURE — 99024 POSTOP FOLLOW-UP VISIT: CPT | Performed by: PHYSICIAN ASSISTANT

## 2024-09-10 PROCEDURE — 80048 BASIC METABOLIC PNL TOTAL CA: CPT

## 2024-09-10 PROCEDURE — 97116 GAIT TRAINING THERAPY: CPT

## 2024-09-10 PROCEDURE — 82948 REAGENT STRIP/BLOOD GLUCOSE: CPT

## 2024-09-10 RX ORDER — ACETAMINOPHEN 325 MG/1
975 TABLET ORAL EVERY 8 HOURS SCHEDULED
Qty: 63 TABLET | Refills: 0 | Status: SHIPPED | OUTPATIENT
Start: 2024-09-10 | End: 2024-09-17

## 2024-09-10 RX ORDER — DIPHENHYDRAMINE HCL 25 MG
25 TABLET ORAL EVERY 6 HOURS PRN
Status: DISCONTINUED | OUTPATIENT
Start: 2024-09-10 | End: 2024-09-10 | Stop reason: HOSPADM

## 2024-09-10 RX ORDER — OXYCODONE HYDROCHLORIDE 5 MG/1
5 TABLET ORAL EVERY 4 HOURS PRN
Qty: 15 TABLET | Refills: 0 | Status: SHIPPED | OUTPATIENT
Start: 2024-09-10 | End: 2024-09-20 | Stop reason: SDUPTHER

## 2024-09-10 RX ORDER — NAPROXEN 250 MG/1
250 TABLET ORAL 2 TIMES DAILY WITH MEALS
Qty: 10 TABLET | Refills: 0 | Status: SHIPPED | OUTPATIENT
Start: 2024-09-10 | End: 2024-10-09

## 2024-09-10 RX ADMIN — INSULIN LISPRO 1 UNITS: 100 INJECTION, SOLUTION INTRAVENOUS; SUBCUTANEOUS at 16:13

## 2024-09-10 RX ADMIN — ENOXAPARIN SODIUM 40 MG: 40 INJECTION SUBCUTANEOUS at 08:50

## 2024-09-10 RX ADMIN — NAPROXEN 250 MG: 250 TABLET ORAL at 16:13

## 2024-09-10 RX ADMIN — PANTOPRAZOLE SODIUM 40 MG: 40 TABLET, DELAYED RELEASE ORAL at 08:50

## 2024-09-10 RX ADMIN — Medication 2.5 MG: at 09:40

## 2024-09-10 RX ADMIN — OXYCODONE HYDROCHLORIDE 5 MG: 5 TABLET ORAL at 11:36

## 2024-09-10 RX ADMIN — DIPHENHYDRAMINE HYDROCHLORIDE 25 MG: 25 TABLET ORAL at 00:24

## 2024-09-10 RX ADMIN — NAPROXEN 250 MG: 250 TABLET ORAL at 08:50

## 2024-09-10 RX ADMIN — ACETAMINOPHEN 975 MG: 325 TABLET ORAL at 14:05

## 2024-09-10 RX ADMIN — INSULIN LISPRO 1 UNITS: 100 INJECTION, SOLUTION INTRAVENOUS; SUBCUTANEOUS at 12:37

## 2024-09-10 RX ADMIN — ATORVASTATIN CALCIUM 20 MG: 20 TABLET, FILM COATED ORAL at 16:13

## 2024-09-10 RX ADMIN — ACETAMINOPHEN 975 MG: 325 TABLET ORAL at 06:58

## 2024-09-10 NOTE — PHYSICAL THERAPY NOTE
"   PT TREATMENT         09/10/24 0930   PT Last Visit   PT Visit Date 09/10/24   Note Type   Note Type Treatment   Pain Assessment   Pain Assessment Tool 0-10   Pain Score No Pain   Restrictions/Precautions   RLE Weight Bearing Per Order NWB   Other Precautions Pain;Fall Risk   General   Chart Reviewed Yes   Cognition   Overall Cognitive Status WFL   Subjective   Subjective \"My pain is much more controlled today.  I want to go home\"   Bed Mobility   Supine to Sit 6  Modified independent   Sit to Supine 6  Modified independent   Transfers   Sit to Stand 5  Supervision   Stand to Sit 5  Supervision   Stand pivot 5  Supervision   Additional items   (with crutches)   Ambulation/Elevation   Gait Assistance 5  Supervision   Assistive Device Crutches   Distance 150 feet   Stair Management Assistance 5  Supervision   Stair Management Technique One rail L;Step to pattern  (1 crutch)   Number of Stairs 4   Balance   Static Sitting Good   Static Standing Fair   Ambulatory Fair   Activity Tolerance   Activity Tolerance Patient tolerated treatment well;Patient limited by fatigue   Exercises   Neuro re-ed x 5 ankle pumps, demoed achilled stretch with towel, quad set x 5, seated knee flexion AAROM in a very small ROM to pt's tolerance.  Pt educated to keep knee extended at rest but to perform knee flexion ROM often.  HEP handout issued.   Assessment   Prognosis Good   Problem List Decreased strength;Decreased range of motion;Impaired balance;Decreased mobility;Pain;Orthopedic restrictions   Assessment Pt demonstrates good ability to ambulate NWB R LE with B axillary crutches on level surfaces and stairs s/p proximal tibal fx/ORIF.  Recommend a rolling walker for home for ease of use and crutches for stairs and out of the house. Pt performed and issued a HEP focusing on ankle ROM, gentle knee ROM, and isometric quad exercise.    The patient's AM-PAC Basic Mobility Inpatient Short Form Raw Score is 23. A Raw score of greater than 16 " suggests the patient may benefit from discharge to home. Please also refer to the recommendation of the Physical Therapist for safe discharge planning.         Plan   Treatment/Interventions Elevations;LE strengthening/ROM;Therapeutic exercise;Gait training;Spoke to case management   Progress Progressing toward goals   PT Frequency Other (Comment)  (daily)   Discharge Recommendation   Rehab Resource Intensity Level, PT III (Minimum Resource Intensity)   Equipment Recommended Walker  (Pt issued crutches)   Wheelchair Package Recommended Standard   Walker Package Recommended Wheeled walker   Change/add to Walker Package? No   AM-PAC Basic Mobility Inpatient   Turning in Flat Bed Without Bedrails 4   Lying on Back to Sitting on Edge of Flat Bed Without Bedrails 4   Moving Bed to Chair 4   Standing Up From Chair Using Arms 4   Walk in Room 4   Climb 3-5 Stairs With Railing 3   Basic Mobility Inpatient Raw Score 23   Basic Mobility Standardized Score 50.88   [facility] Highest Level Of Mobility   -HLM Goal 7: Walk 25 feet or more   JH-HLM Achieved 7: Walk 25 feet or more   Licensure   [place] License Number  Queenie Gregg PT [id number]

## 2024-09-10 NOTE — DISCHARGE SUMMARY
"Discharge Summary - Hospitalist   Name: Asa Deng 48 y.o. male I MRN: [id number]  Unit/Bed#: [address] [place] [address] Date of Admission: 9/8/2024   Date of Service: 9/10/2024 I Hospital Day: 1     Assessment & Plan  Closed fracture of right tibial plateau  XR: Schatzker II plateau fracture  . Interpreted by Dr. Munroe  ( orthopedics)   Swelling noted along the prixam tibia RT side   Patient noted to have intact sensory examination peripheral pulses       S/p ORIF on 9/8/2024 with successful reduction and fixation per orthopedics.     Patient has intractable pain yesterday but no evidence of compartment syndrome.  Started on scheduled Tylenol and Naprosyn with significant improvement in pain  Continue bowel regimen with oxycodone as needed for breakthrough pain  Patient is ambulating well with crutches and will need outpatient follow-up with orthopedics.  Type 2 diabetes mellitus without complication, without long-term current use of insulin ([facility])  No results found for: \"HGBA1C\"    Recent Labs     09/09/24  2104 09/10/24  0734 09/10/24  1118 09/10/24  1549   POCGLU 183* 147* 174* 199*       Blood Sugar Average: Last 72 hrs:  (P) 180.72794942437882917/5/2024  HBA1C: 6.8       Patient has been on Humalog sliding scale with Accu-Cheks before every meal and at bedtime and diabetic diet during hospitalization.  May resume Januvia, empagliflozin and metformin upon discharge      Primary hypertension  Continue Lotensin upon discharge  Acute pain of right knee  Seondary to tibial plateau fracture Swelling noted along the prixam tibia RT side Tender to palpation  Intact sensation,ROM with limitations, pulses.     9/9 with continue RT LE pain . No tingling/numbness/change in toe color/pulses palpable.   Orthopedics considering compartment syndrome       Plan:   Orthopedics team following ; patient is back on NPO may be taken to OR for compartment syndrome ??   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   He fell on his right side , landed on his RT knee .   No head trauma   No LOC     Plan:   Refer to A and P for tibeal fracture   Refer to A and P for knee pain  Patient is doing well with PT and is ambulating well with crutches  SIRS (systemic inflammatory response syndrome) (HCC)  Tachycardia, leukocytosis  No source of infection    Mostly secondary to recent ORIF on 9/8 and RT LE pain     White count was slightly elevated yesterday which has normalized.     Medical Problems       Resolved Problems  Date Reviewed: 9/9/2024   None       Discharging Physician / Practitioner: Yaa Puentes MD  PCP: Henry Denny  Admission Date:   Admission Orders (From admission, onward)       Ordered        09/08/24 1720  Place in Observation  Once            09/08/24 1718  INPATIENT ADMISSION  Once,   Status:  Canceled                          Discharge Date: 09/10/24    Consultations During Hospital Stay:  Orthopedics    Procedures Performed:   Open reduction internal fixation of the bicondylar right tibial plateau fracture and open reduction internal fixation of the right tibial tubercle and anterior right tibial shaft fracture    Significant Findings / Test Results:   Right leg x-ray with acute depressed fracture of the lateral tibial plateau with vertically oriented fracture through proximal tibial metadiaphysis  CAT scan of the right leg with comminuted proximal tibia fracture with associated moderate lipo hemarthrosis and mild subcutaneous hematoma anterior to the proximal tibia       Outpatient Tests Requested:  Follow-up with orthopedics    Complications: None    Reason for Admission:     Hospital Course:   Asa Deng is a 48 y.o. male patient with past medical Struve diabetes, hypertension and obesity who originally presented to the hospital on 9/8/2024 due to right knee pain  "after sustaining a fall while addressing situation at work.  Patient complaining of significant right knee pain.  In the ED patient's workup showed tibial plateau fracture and patient was seen by orthopedics and underwent ORIF of the right leg.  Postprocedure patient had significant pain but overall sensation and mobility was normal.  Patient was started on scheduled Tylenol and Naprosyn with improved pain.  Patient was doing well postoperatively ambulating with the help of crutches.  Patient to be discharged home with an outpatient follow-up with orthopedics.    Please see above list of diagnoses and related plan for additional information.     Condition at Discharge: fair    Discharge Day Visit / Exam:   Subjective: This feeling much better.  Ambulated the hallway with the help of crutches with physical therapy and also did the stairs.  Vitals: Blood Pressure: 119/71 (09/10/24 1527)  Pulse: 98 (09/10/24 1527)  Temperature: 99 °F (37.2 °C) (09/10/24 0818)  Temp Source: Oral (09/10/24 0818)  Respirations: 16 (09/10/24 1527)  Height: 5' 7\" (170.2 cm) (09/09/24 0014)  Weight - Scale: 97 kg (213 lb 13.5 oz) (09/09/24 0014)  SpO2: 95 % (09/10/24 1527)  Exam:   Physical Exam  Constitutional:       General: He is not in acute distress.  HENT:      Head: Normocephalic and atraumatic.   Eyes:      Conjunctiva/sclera: Conjunctivae normal.      Pupils: Pupils are equal, round, and reactive to light.   Cardiovascular:      Rate and Rhythm: Normal rate and regular rhythm.      Heart sounds: Normal heart sounds.   Pulmonary:      Effort: Pulmonary effort is normal. No respiratory distress.      Breath sounds: No wheezing, rhonchi or rales.   Chest:      Chest wall: No tenderness.   Abdominal:      General: Bowel sounds are normal. There is no distension.      Palpations: Abdomen is soft.      Tenderness: There is no abdominal tenderness. There is no guarding or rebound.   Musculoskeletal:         General: No edema.      Cervical " back: Normal range of motion and neck supple.      Comments: Right knee dressing with Ace wrap in place  Sensation normal in right foot with good mobility of the right ankle and toes.   Skin:     General: Skin is warm and dry.      Findings: No rash.   Neurological:      Mental Status: He is alert.      Cranial Nerves: No cranial nerve deficit.         Discharge instructions/Information to patient and family:   See after visit summary for information provided to patient and family.      Provisions for Follow-Up Care:  See after visit summary for information related to follow-up care and any pertinent home health orders.      Mobility at time of Discharge:   Basic Mobility Inpatient Raw Score: 23  JH-HLM Goal: 7: Walk 25 feet or more  JH-HLM Achieved: 7: Walk 25 feet or more  HLM Goal achieved. Continue to encourage appropriate mobility.     Disposition:   Home    Planned Readmission: No    Discharge Medications:  See after visit summary for reconciled discharge medications provided to patient and/or family.      Administrative Statements   Discharge Statement:  I have spent a total time of 35 minutes in caring for this patient on the day of the visit/encounter. >30 minutes of time was spent on: Diagnostic results, Risks and benefits of tx options, Instructions for management, Patient and family education, Documenting in the medical record, and Reviewing / ordering tests, medicine, procedures  .    **Please Note: This note may have been constructed using a voice recognition system**

## 2024-09-10 NOTE — OCCUPATIONAL THERAPY NOTE
"OT TREATMENT         09/10/24 1056   OT Last Visit   OT Visit Date 09/10/24   Note Type   Note Type Treatment   Pain Assessment   Pain Assessment Tool 0-10   Pain Score 4   Pain Location/Orientation Orientation: Right;Location: Leg   Restrictions/Precautions   RLE Weight Bearing Per Order NWB   Braces or Orthoses Splint   Other Precautions Fall Risk;Pain   ADL   LB Dressing Assistance 5  Supervision/Setup   LB Dressing Deficit Thread RLE into pants;Thread LLE into pants;Pull up over hips   LB Dressing Comments donning shorts long sit in bed   Bed Mobility   Supine to Sit 6  Modified independent   Sit to Supine 6  Modified independent   Transfers   Sit to Stand 5  Supervision   Stand to Sit 5  Supervision   Toilet transfer 5  Supervision   Additional items Standard toilet   Additional Comments pt educated on tub transfer and equipment. pt verbalized understanding   Subjective   Subjective \"I want to go home today\"   Cognition   Overall Cognitive Status WFL   Arousal/Participation Cooperative   Attention Within functional limits   Orientation Level Oriented X4   Following Commands Follows all commands and directions without difficulty   Assessment   Assessment Patient seen for OT treatment.  Patient is cooperative and pleasant.  Patient is requiring supervision today for transfers and mobility and LB dressing.  Patient has a supportive family at home to assist with IADLS and ADLS as needed.  Patient able to maintain NWB with use of RW with transfers and mobility. The patient's raw score on the AM-PAC Daily Activity Inpatient Short Form is 23. A raw score of greater than or equal to 19 suggests the patient may benefit from discharge to home. Please refer to the recommendation of the Occupational Therapist for safe discharge planning.   Plan   Treatment Interventions ADL retraining;Functional transfer training;Patient/family training;Activityengagement;Equipment evaluation/education   OT Frequency 3-5x/wk   Discharge " Recommendation   Rehab Resource Intensity Level, OT III (Minimum Resource Intensity)   AM-PAC Daily Activity Inpatient   Lower Body Dressing 4   Bathing 3   Toileting 4   Upper Body Dressing 4   Grooming 4   Eating 4   Daily Activity Raw Score 23   Daily Activity Standardized Score (Calc for Raw Score >=11) 51.12   AM-PAC Applied Cognition Inpatient   Following a Speech/Presentation 4   Understanding Ordinary Conversation 4   Taking Medications 4   Remembering Where Things Are Placed or Put Away 4   Remembering List of 4-5 Errands 4   Taking Care of Complicated Tasks 4   Applied Cognition Raw Score 24   Applied Cognition Standardized Score 62.21   Licensure   [place] License Number  Nina Cedeno MS OTR/L [id number]

## 2024-09-10 NOTE — ASSESSMENT & PLAN NOTE
"No results found for: \"HGBA1C\"    Recent Labs     09/09/24  1826 09/09/24  2104 09/10/24  0734 09/10/24  1118   POCGLU 235* 183* 147* 174*       Blood Sugar Average: Last 72 hrs:  (P) 177    "

## 2024-09-10 NOTE — PLAN OF CARE
Problem: OCCUPATIONAL THERAPY ADULT  Goal: Performs self-care activities at highest level of function for planned discharge setting.  See evaluation for individualized goals.  Description: Treatment Interventions: ADL retraining, Functional transfer training, UE strengthening/ROM, Endurance training, Patient/family training, Equipment evaluation/education, Compensatory technique education, Energy conservation, Activityengagement, Continued evaluation          See flowsheet documentation for full assessment, interventions and recommendations.   Outcome: Progressing  Note: Limitation: Decreased ADL status, Decreased endurance, Decreased self-care trans, Decreased high-level ADLs (impaired pain, standing tolerance, balance, forward functional reach, R LE ROM /strength)     Assessment: Patient seen for OT treatment.  Patient is cooperative and pleasant.  Patient is requiring supervision today for transfers and mobility and LB dressing.  Patient has a supportive family at home to assist with IADLS and ADLS as needed.  Patient able to maintain NWB with use of RW with transfers and mobility.     Rehab Resource Intensity Level, OT: III (Minimum Resource Intensity)

## 2024-09-10 NOTE — CASE MANAGEMENT
Case Management Discharge Planning Note    Patient name Asa Deng  Location [address] [facility]/[address] 214 MRN [id number]  : 1975 Date 9/10/2024       Current Admission Date: 2024  Current Admission Diagnosis:Acute pain of right knee   Patient Active Problem List    Diagnosis Date Noted Date Diagnosed    SIRS (systemic inflammatory response syndrome) (HCC) 2024     Type 2 diabetes mellitus without complication, without long-term current use of insulin (HCC) 2024     Primary hypertension 2024     Acute pain of right knee 2024     Closed fracture of right tibial plateau 2024     Obesity 2024     Fall 2024       LOS (days): 1  Geometric Mean LOS (GMLOS) (days):   Days to GMLOS:     OBJECTIVE:            Current admission status: Observation   Preferred Pharmacy:   [id number] - [facility] [address]   [address] [facility] [address]  Phone: [phone] Fax: [phone]    Primary Care Provider: Henry Denny    Primary Insurance: WORK[employer]ENSATION  Secondary Insurance: [facility]    DISCHARGE DETAILS:    Requested Home Health Care         Is the patient interested in HHC at discharge?: Yes  Home Health Discipline requested:: Occupational Therapy, Physical Therapy  Home Health Agency Name:: Other (TBD pending acceptance)  HHA External Referral Reason (only applicable if external HHA name selected): Services not provided in network or near patient location  Home Health Follow-Up Provider:: PCP  Home Health Services Needed:: Evaluate Functional Status and Safety, Gait/ADL Training, Post-Op Care and Assessment, Strengthening/Theraputic Exercises to Improve Function  Homebound Criteria Met:: Requires the Assistance of Another Person for Safe Ambulation or to Leave the Home, Uses an Assist Device (i.e. cane, walker, etc)  Supporting Clincal Findings:: Limited Endurance    DME Referral Provided  Referral made for DME?: Yes  DME  referral completed for the following items:: Walker  DME Supplier Name:: [employer]    Other Referral/Resources/Interventions Provided:  Interventions: HHC, DME  Referral Comments: RN CM made aware patient will require walker. [facility] via [facility] to [facility] and delivered walker at bedside, signed copy of receipt provided to patient and original placed in dme folder and uploaded via [facility] to [employer]. Lucinda at [facility] made aware patient's walker will be through workman's comp and not his insurance. Patient does not have workman's comp information and Lucinda made aware. Patient voiced concerns about going to outpatient rehab as nearest  outpatient rehab location is around 40 mins away from his residence and his insurance will not cover outpatient rehab due to workman's comp. RN CM sent referral in aidin to [place] [facility] if able to accept patient. RN CM also emailed [facility] Specialist Mackenzie GUEVARA regarding patient's situation and if it would be possible for patient to receive home rehab. Patient updated on the same. RN CM also asked patient if he would consider [facility] if that option was available. Patient insists he wants to go home. Dr. Puentes updated.     Treatment Team Recommendation: Acute Rehab, Home with Home Health Care (outpatient rehab)  Discharge Destination Plan:: Home, Other  Transport at Discharge : Family

## 2024-09-10 NOTE — ASSESSMENT & PLAN NOTE
XR: Schatzker II plateau fracture  . Interpreted by Dr. Munroe  ( orthopedics)   Swelling noted along the prixam tibia RT side   Patient noted to have intact sensory examination peripheral pulses       S/p ORIF on 9/8/2024 with successful reduction and fixation per orthopedics.     Patient has intractable pain yesterday but no evidence of compartment syndrome.  Started on scheduled Tylenol and Naprosyn with significant improvement in pain  Continue bowel regimen with oxycodone as needed for breakthrough pain  Patient is ambulating well with crutches and will need outpatient follow-up with orthopedics.

## 2024-09-10 NOTE — PLAN OF CARE
Problem: Prexisting or High Potential for Compromised Skin Integrity  Goal: Skin integrity is maintained or improved  Description: INTERVENTIONS:  - Identify patients at risk for skin breakdown  - Assess and monitor skin integrity  - Assess and monitor nutrition and hydration status  - Monitor labs   - Assess for incontinence   - Turn and reposition patient  - Assist with mobility/ambulation  - Relieve pressure over bony prominences  - Avoid friction and shearing  - Provide appropriate hygiene as needed including keeping skin clean and dry  - Evaluate need for skin moisturizer/barrier cream  - Collaborate with interdisciplinary team   - Patient/family teaching  - Consider wound care consult   Outcome: Progressing     Problem: SKIN/TISSUE INTEGRITY - ADULT  Goal: Incision(s), wounds(s) or drain site(s) healing without S/S of infection  Description: INTERVENTIONS  - Assess and document dressing, incision, wound bed, drain sites and surrounding tissue  - Provide patient and family education  - Perform skin care/dressing changes every prn   Outcome: Progressing     Problem: HEMATOLOGIC - ADULT  Goal: Maintains hematologic stability  Description: INTERVENTIONS  - Assess for signs and symptoms of bleeding or hemorrhage  - Monitor labs  - Administer supportive blood products/factors as ordered and appropriate  Outcome: Progressing     Problem: MUSCULOSKELETAL - ADULT  Goal: Maintain or return mobility to safest level of function  Description: INTERVENTIONS:  - Assess patient's ability to carry out ADLs; assess patient's baseline for ADL function and identify physical deficits which impact ability to perform ADLs (bathing, care of mouth/teeth, toileting, grooming, dressing, etc.)  - Assess/evaluate cause of self-care deficits   - Assess range of motion  - Assess patient's mobility  - Assess patient's need for assistive devices and provide as appropriate  - Encourage maximum independence but intervene and supervise when  necessary  - Involve family in performance of ADLs  - Assess for home care needs following discharge   - Consider OT consult to assist with ADL evaluation and planning for discharge  - Provide patient education as appropriate  Outcome: Progressing  Goal: Maintain proper alignment of affected body part  Description: INTERVENTIONS:  - Support, maintain and protect limb and body alignment  - Provide patient/ family with appropriate education  Outcome: Progressing

## 2024-09-10 NOTE — PROGRESS NOTES
"Progress Note - Orthopedics   Asa Deng 48 y.o. male MRN: [id number]  Unit/Bed#: [address] [place] [address] Encounter: [id number]        Subjective: POD#2 status post open reduction internal fixation of bicondylar right tibial plateau fracture and open reduction internal fixation of right tibial tubercle/anterior right tibial shaft fracture by Dr. Canales. Patient checked this afternoon and states that he is doing much better today. He was able to work with PT/OT without any issues, including stairs and ADLS. He has been compliant with NWB. He continues to note good sensation of the right lower extremity.     Vitals: Blood pressure 121/70, pulse 83, temperature 99 °F (37.2 °C), temperature source Oral, resp. rate 16, height 5' 7\" (1.702 m), weight 97 kg (213 lb 13.5 oz), SpO2 94%.,Body mass index is 33.49 kg/m².      Intake/Output Summary (Last 24 hours) at 9/10/2024 1359  Last data filed at 9/10/2024 0700  Gross per 24 hour   Intake --   Output 1100 ml   Net -1100 ml       Invasive Devices       Peripheral Intravenous Line  Duration             Peripheral IV 09/08/24 Left Antecubital 1 day    Peripheral IV 09/08/24 Left Hand 1 day                        Ortho Exam:   General: Patient alert and oriented X 3 in NAD   RLE: Dressing CDI. Minimal swelling lower leg, but all compartments soft today.  Patient able to actively move toes and ankle. Minimal discomfort with passive ROM of ankle. Sensation intact. 2+ DP pulse. Good capillary refill all digits      Lab, Imaging and other studies: I have personally reviewed pertinent lab results.  CBC:   Lab Results   Component Value Date    WBC 8.96 09/10/2024    HGB 12.7 09/10/2024    HCT 38.0 09/10/2024    MCV 87 09/10/2024     09/10/2024    RBC 4.35 09/10/2024    MCH 29.2 09/10/2024    MCHC 33.4 09/10/2024    RDW 13.0 09/10/2024    MPV 10.5 09/10/2024    NRBC 0 09/10/2024     CMP:   Lab Results   Component Value Date    CL 99 09/10/2024    CO2 25 09/10/2024    BUN 25 09/10/2024 "    CREATININE 0.89 09/10/2024    CALCIUM 8.5 09/10/2024    AST 36 09/09/2024    ALT 36 09/09/2024    ALKPHOS 43 09/09/2024    EGFR 102 09/10/2024     PT/INR:   Lab Results   Component Value Date    INR 0.95 09/08/2024       Assessment:  POD#2 status post open reduction internal fixation of bicondylar right tibial plateau fracture and open reduction internal fixation of right tibial tubercle/anterior right tibial shaft fracture by Dr. Canales    Plan:  -no signs of compartment syndrome today. We discussed the signs/symptoms or compartment symptoms. He will notify nursing staff immediately if any increase in pain, numbness of the leg, or inability to move the foot/ankle.   -analgesia prn.   -NWB RLE, he has done very well with PT/OT.   -Lovenox for DVT prophylaxis for 4 weeks.   -Post-op antibiotics for 24 hours completed  -dressing: keep in place and dry until follow up  -dispo: Patient has made excellent progress over the last 24 hours.  There is no sign of compartment syndrome.  He may be discharged home from an orthopedic perspective and should follow-up with Dr. Canales next week.  He should keep the dressing in place and dry until his follow-up appointment.  This was discussed with the patient and his wife.  He will remain nonweightbearing on the right lower extremity, and is aware of signs and symptoms of compartment syndrome and reasons to return to the ER.  He should continue with Lovenox daily for DVT prophylaxis    Kavin Muñoz PA-C

## 2024-09-10 NOTE — ASSESSMENT & PLAN NOTE
"No results found for: \"HGBA1C\"    Recent Labs     09/09/24  2104 09/10/24  0734 09/10/24  1118 09/10/24  1549   POCGLU 183* 147* 174* 199*         Blood Sugar Average: Last 72 hrs:  (P) 180.60896255346225701/5/2024  HBA1C: 6.8       Patient has been on Humalog sliding scale with Accu-Cheks before every meal and at bedtime and diabetic diet during hospitalization.  May resume Januvia, empagliflozin and metformin upon discharge      "

## 2024-09-10 NOTE — ASSESSMENT & PLAN NOTE
Tachycardia, leukocytosis  No source of infection    Mostly secondary to recent ORIF on 9/8 and RT LE pain     White count was slightly elevated yesterday which has normalized.

## 2024-09-10 NOTE — ASSESSMENT & PLAN NOTE
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   He fell on his right side , landed on his RT knee .   No head trauma   No LOC     Plan:   Refer to A and P for tibeal fracture   Refer to A and P for knee pain  Patient is doing well with PT and is ambulating well with crutches

## 2024-09-11 NOTE — CASE MANAGEMENT
Case Management Discharge Planning Note    Patient name Asa Deng  Location [address] [facility]/[address] 214 MRN [id number]  : 1975 Date 2024       Current Admission Date: 2024  Current Admission Diagnosis:Closed fracture of right tibial plateau   Patient Active Problem List    Diagnosis Date Noted Date Diagnosed    SIRS (systemic inflammatory response syndrome) ([facility]) 2024     Type 2 diabetes mellitus without complication, without long-term current use of insulin (HCC) 2024     Primary hypertension 2024     Acute pain of right knee 2024     Closed fracture of right tibial plateau 2024     Obesity 2024     Fall 2024       LOS (days): 1  Geometric Mean LOS (GMLOS) (days):   Days to GMLOS:     OBJECTIVE:  Risk of Unplanned Readmission Score: 5.74         Current admission status: Observation   Preferred Pharmacy:   [facility] #[id number] [address] [address]  Phone: [phone] Fax: [phone]    Primary Care Provider: Henry Denny    Primary Insurance: [employer]ENSATION  Secondary Insurance: [facility]    DISCHARGE DETAILS:     Other Referral/Resources/Interventions Provided:  Referral Comments: Voicemails left for [facility] health & [facility] care regarding new referral and requested ray back. RN SHAHRAM spoke with Sana at [facility] care agency and they are unable to accept patient.

## 2024-09-19 ENCOUNTER — HOSPITAL ENCOUNTER (OUTPATIENT)
Dept: RADIOLOGY | Facility: HOSPITAL | Age: 49
Discharge: HOME | End: 2024-09-19
Payer: COMMERCIAL

## 2024-09-19 ENCOUNTER — OFFICE VISIT (OUTPATIENT)
Dept: OBGYN CLINIC | Facility: HOSPITAL | Age: 49
End: 2024-09-19

## 2024-09-19 VITALS
HEART RATE: 87 BPM | BODY MASS INDEX: 33.49 KG/M2 | HEIGHT: 67 IN | SYSTOLIC BLOOD PRESSURE: 114 MMHG | DIASTOLIC BLOOD PRESSURE: 76 MMHG

## 2024-09-19 DIAGNOSIS — Z87.81 S/P ORIF (OPEN REDUCTION INTERNAL FIXATION) FRACTURE: ICD-10-CM

## 2024-09-19 DIAGNOSIS — S82.141A CLOSED FRACTURE OF RIGHT TIBIAL PLATEAU, INITIAL ENCOUNTER: ICD-10-CM

## 2024-09-19 DIAGNOSIS — S82.141D CLOSED FRACTURE OF RIGHT TIBIAL PLATEAU WITH ROUTINE HEALING, SUBSEQUENT ENCOUNTER: Primary | ICD-10-CM

## 2024-09-19 DIAGNOSIS — Z98.890 S/P ORIF (OPEN REDUCTION INTERNAL FIXATION) FRACTURE: ICD-10-CM

## 2024-09-19 PROCEDURE — 73590 X-RAY EXAM OF LOWER LEG: CPT

## 2024-09-19 PROCEDURE — 99024 POSTOP FOLLOW-UP VISIT: CPT

## 2024-09-19 RX ORDER — ASPIRIN 81 MG/1
81 TABLET, CHEWABLE ORAL 2 TIMES DAILY
Qty: 56 TABLET | Refills: 0 | Status: SHIPPED | OUTPATIENT
Start: 2024-09-19 | End: 2024-10-17

## 2024-09-19 NOTE — PROGRESS NOTES
Assessment:   Diagnosis ICD-10-CM Associated Orders   1. Closed fracture of right tibial plateau with routine healing, subsequent encounter  S82.141D XR tibia fibula 2 vw right     aspirin 81 mg chewable tablet      2. S/P ORIF (open reduction internal fixation) fracture  Z98.890 aspirin 81 mg chewable tablet    Z87.81         Plan:  A discussion was had with the patient that his imaging looks very good at today's visit.  He must remain NWB for a total of 8 weeks.  His WBAT date will be 11/3/24.  He must work very hard on his knee ROM.  We discussed the importance especially of getting his knee fully straight.  We also discussed using a bath or beach towel to gently pull the foot back into dorsiflexion in order to prevent a foot drop.  We discussed that if he uses the oxycodone sparingly and only as needed for severe 9-10/10 pain, he will be unlikely to become addicted to this medication.  He should continue to use OTC medications and naproxen PRN mild to moderate pain.  The patient has not been taking blood thinner medication since his hospital discharge.  Aspirin 81 mg BID was prescribed today for 4 weeks.  Swelling can be normal to some extent for 6-12 months after surgery.  We discussed rest, ice, compression, and elevation.  The numbness in his foot is likely due to nerve irritation from his major injury and leg swelling.  This should resolve over the next 12-18 months.  His sutures were removed and Steri-Strips were applied.  He may shower at this time and pat the wounds dry when he is done.  The Steri-Strips will fall off on their own in the shower in 7-10 days.  He should avoid soaking his wounds in a bath or pool.     To do next visit:  Follow-up in 4 weeks for new X-rays.  He may follow-up in [place] if this location is more convenient for him.    The above stated was discussed in layman's terms and the patient expressed understanding.  All questions were answered to the patient's satisfaction.          Subjective:   Asa Deng is a 48 y.o. male who presents to the office today for a first post-op appointment from his ORIF of his right tibial plateau fracture on 9/824.  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  He reports severe pain today, but he has not been taking his oxycodone because he is afraid of becoming addicted to it.  Has been compliant with NWB status.  His foot feels a little numb and tingly.      Review of systems negative unless otherwise specified in HPI    Past Medical History:   Diagnosis Date    Diabetes mellitus (HCC)     Hypertension        Past Surgical History:   Procedure Laterality Date    ORIF TIBIA FRACTURE Right 9/8/2024    Procedure: OPEN REDUCTION W/ INTERNAL FIXATION (ORIF) TIBIA;  Surgeon: Mando Canales MD;  Location: [facility];  Service: Orthopedics       History reviewed. No pertinent family history.    Social History     Occupational History    Not on file   Tobacco Use    Smoking status: Never    Smokeless tobacco: Never   Vaping Use    Vaping status: Never Used   Substance and Sexual Activity    Alcohol use: Never    Drug use: Never    Sexual activity: Not on file         Current Outpatient Medications:     aspirin 81 mg chewable tablet, Chew 1 tablet (81 mg total) 2 (two) times a day for 28 days, Disp: 56 tablet, Rfl: 0    atorvastatin (LIPITOR) 20 mg tablet, Take 20 mg by mouth daily, Disp: , Rfl:     benazepril (LOTENSIN) 10 mg tablet, Take 10 mg by mouth daily, Disp: , Rfl:     Empagliflozin (Jardiance) 25 MG TABS, Take 25 mg by mouth every morning, Disp: , Rfl:     metFORMIN (GLUCOPHAGE) 1000 MG tablet, Take 1,000 mg by mouth 2 (two) times a day with meals, Disp: , Rfl:     naproxen (NAPROSYN) 250 mg tablet, Take 1 tablet (250 mg total) by mouth 2 (two) times a day with meals for 5 days, Disp: 10 tablet, Rfl: 0    pantoprazole (PROTONIX) 40 mg tablet, Take 40 mg by mouth daily, Disp: , Rfl:  "    sitaGLIPtin (JANUVIA) 100 mg tablet, Take 100 mg by mouth daily, Disp: , Rfl:     oxyCODONE (ROXICODONE) 5 immediate release tablet, Take 1 tablet (5 mg total) by mouth every 4 (four) hours as needed for severe pain for up to 15 doses Max Daily Amount: 30 mg (Patient not taking: Reported on 9/19/2024), Disp: 15 tablet, Rfl: 0    No Known Allergies         Vitals:    09/19/24 1058   BP: 114/76   Pulse: 87       Objective:    General:  Patient is WDWN, alert and oriented, appears stated age, and is in no acute distress.    Musculoskeletal:    Right Knee:    Inspection:  Incisions are well-approximated and well-healing without erythema or purulent material indicative of infection.    Range of Motion:  Knee ROM: 5-60 degrees.    Palpation:  He is tender with palpation of his incision sites.        Diagnostics, reviewed and taken today if performed as documented:    The attending physician has personally reviewed the pertinent films in PACS and interpretation is as follows:  Right Tibia X-rays 9/19/24:  The patient's fractures are held in stable alignment.  There is no evidence of hardware loosening or failure.      Procedures, if performed today:    None performed      Portions of the record may have been created with voice recognition software.  Occasional wrong word or \"sound a like\" substitutions may have occurred due to the inherent limitations of voice recognition software.  Read the chart carefully and recognize, using context, where substitutions have occurred.  "

## 2024-09-20 DIAGNOSIS — S82.121A CLOSED DISPLACED FRACTURE OF LATERAL CONDYLE OF RIGHT TIBIA, INITIAL ENCOUNTER: ICD-10-CM

## 2024-09-20 RX ORDER — OXYCODONE HYDROCHLORIDE 5 MG/1
5 TABLET ORAL EVERY 4 HOURS PRN
Qty: 15 TABLET | Refills: 0 | Status: SHIPPED | OUTPATIENT
Start: 2024-09-20

## 2024-09-20 NOTE — TELEPHONE ENCOUNTER
Medication:  PDMP Unable to complete [place] PDMP. Will forward to clinical staff to review.     Refill must be reviewed and completed by the office or provider. The refill is unable to be approved or denied by the medication management team.

## 2024-09-20 NOTE — TELEPHONE ENCOUNTER
Reason for call:   [x] Refill   [] Prior Auth  [] Other:     Office:   [] PCP/Provider -   [x] Specialty/Provider -     Medication: OXYCODONE    Dose/Frequency: 5 MG    Quantity: 15    Pharmacy:   [id number] - [address] [address]  Phone: [phone]  Fax: [phone]  LIANET #: [id number]       Does the patient have enough for 3 days?   [] Yes   [x] No - Send as HP to POD

## 2024-09-24 ENCOUNTER — TELEPHONE (OUTPATIENT)
Dept: OBGYN CLINIC | Facility: HOSPITAL | Age: 49
End: 2024-09-24

## 2024-09-24 NOTE — TELEPHONE ENCOUNTER
Caller: Vicenta Luico Call    Doctor: Parker    Reason for call: Faxed PT script to [phone]     Call back#: n/a

## 2024-09-30 DIAGNOSIS — S82.121A CLOSED DISPLACED FRACTURE OF LATERAL CONDYLE OF RIGHT TIBIA, INITIAL ENCOUNTER: Primary | ICD-10-CM

## 2024-10-16 NOTE — PROGRESS NOTES
Assessment:   Diagnosis ICD-10-CM Associated Orders   1. Closed fracture of right tibial plateau with routine healing, subsequent encounter  S82.141D       2. S/P ORIF (open reduction internal fixation) fracture  Z98.890     Z87.81           Plan:  A discussion was had with the patient that his imaging looks very good at today's visit.  He must remain NWB for a total of 8 weeks.  His WBAT date will be 11/3/24.  He should continue to work very hard on his knee ROM.  We discussed exercises including leg raises and using a bath or beach towel to gently pull the foot back into dorsiflexion for strengthening and ROM.  We did discuss PT referral, but he prefers to work on his strength and ROM on his own first.  Continue to use OTC medications PRN for pain.  His remaining Steri-Strips were removed today.  His distal-most pin site was draining some bloody material, so it was cauterized with silver nitrate at this time.     To do next visit:  Follow-up in 4-6 weeks for new X-rays.      The above stated was discussed in layman's terms and the patient expressed understanding.  All questions were answered to the patient's satisfaction.         Subjective:   Asa Deng is a 48 y.o. male who presents to the office today for a 5.5-week post-op appointment from his ORIF of his right tibial plateau fracture on 9/824.  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  He reports improved pain today which is well-controlled with OTC medications.  His swelling has gone down.  Has been compliant with NWB status.        Review of systems negative unless otherwise specified in HPI    Past Medical History:   Diagnosis Date    Diabetes mellitus (HCC)     Hypertension        Past Surgical History:   Procedure Laterality Date    ORIF TIBIA FRACTURE Right 9/8/2024    Procedure: OPEN REDUCTION W/ INTERNAL FIXATION (ORIF) TIBIA;  Surgeon: Mando Canales MD;  Location: [facility] OR;   Service: Orthopedics       History reviewed. No pertinent family history.    Social History     Occupational History    Not on file   Tobacco Use    Smoking status: Never    Smokeless tobacco: Never   Vaping Use    Vaping status: Never Used   Substance and Sexual Activity    Alcohol use: Never    Drug use: Never    Sexual activity: Not on file         Current Outpatient Medications:     aspirin 81 mg chewable tablet, Chew 1 tablet (81 mg total) 2 (two) times a day for 28 days, Disp: 56 tablet, Rfl: 0    atorvastatin (LIPITOR) 20 mg tablet, Take 20 mg by mouth daily, Disp: , Rfl:     benazepril (LOTENSIN) 10 mg tablet, Take 10 mg by mouth daily, Disp: , Rfl:     Empagliflozin (Jardiance) 25 MG TABS, Take 25 mg by mouth every morning, Disp: , Rfl:     metFORMIN (GLUCOPHAGE) 1000 MG tablet, Take 1,000 mg by mouth 2 (two) times a day with meals, Disp: , Rfl:     naproxen (NAPROSYN) 250 mg tablet, Take 1 tablet (250 mg total) by mouth 2 (two) times a day with meals for 5 days, Disp: 10 tablet, Rfl: 0    pantoprazole (PROTONIX) 40 mg tablet, Take 40 mg by mouth daily, Disp: , Rfl:     sitaGLIPtin (JANUVIA) 100 mg tablet, Take 100 mg by mouth daily, Disp: , Rfl:     oxyCODONE (ROXICODONE) 5 immediate release tablet, Take 1 tablet (5 mg total) by mouth every 4 (four) hours as needed for severe pain for up to 15 doses Max Daily Amount: 30 mg (Patient not taking: Reported on 10/17/2024), Disp: 15 tablet, Rfl: 0    No Known Allergies         Vitals:    10/17/24 1124   BP: 123/81   Pulse: 101         Objective:    General:  Patient is WDWN, alert and oriented, appears stated age, and is in no acute distress.    Musculoskeletal:    Right Knee:    Inspection:  Incisions are well-approximated and well-healing without erythema or purulent material indicative of infection.  Mild bloody drainage from the distal-most pin site.    Range of Motion:  Knee ROM: 3-90 degrees.    Palpation:  He is minimally tender with palpation of his  "incision sites.        Diagnostics, reviewed and taken today if performed as documented:    The attending physician has personally reviewed the pertinent films in PACS and interpretation is as follows:  Right Tibia X-rays 10/17/24:  The patient's fractures are held in stable alignment.  There is no evidence of hardware loosening or failure.      Procedures, if performed today:    None performed      Portions of the record may have been created with voice recognition software.  Occasional wrong word or \"sound a like\" substitutions may have occurred due to the inherent limitations of voice recognition software.  Read the chart carefully and recognize, using context, where substitutions have occurred.  "

## 2024-10-17 ENCOUNTER — OFFICE VISIT (OUTPATIENT)
Dept: OBGYN CLINIC | Facility: HOSPITAL | Age: 49
End: 2024-10-17

## 2024-10-17 ENCOUNTER — HOSPITAL ENCOUNTER (OUTPATIENT)
Dept: RADIOLOGY | Facility: HOSPITAL | Age: 49
Discharge: HOME | End: 2024-10-17
Payer: COMMERCIAL

## 2024-10-17 VITALS
BODY MASS INDEX: 33.49 KG/M2 | DIASTOLIC BLOOD PRESSURE: 81 MMHG | SYSTOLIC BLOOD PRESSURE: 123 MMHG | HEIGHT: 67 IN | HEART RATE: 101 BPM

## 2024-10-17 DIAGNOSIS — S82.141D CLOSED FRACTURE OF RIGHT TIBIAL PLATEAU WITH ROUTINE HEALING, SUBSEQUENT ENCOUNTER: Primary | ICD-10-CM

## 2024-10-17 DIAGNOSIS — Z87.81 S/P ORIF (OPEN REDUCTION INTERNAL FIXATION) FRACTURE: ICD-10-CM

## 2024-10-17 DIAGNOSIS — S82.121A CLOSED DISPLACED FRACTURE OF LATERAL CONDYLE OF RIGHT TIBIA, INITIAL ENCOUNTER: ICD-10-CM

## 2024-10-17 DIAGNOSIS — Z98.890 S/P ORIF (OPEN REDUCTION INTERNAL FIXATION) FRACTURE: ICD-10-CM

## 2024-10-17 PROCEDURE — 73590 X-RAY EXAM OF LOWER LEG: CPT

## 2024-10-17 PROCEDURE — 99024 POSTOP FOLLOW-UP VISIT: CPT

## 2024-11-06 DIAGNOSIS — S82.141D CLOSED FRACTURE OF RIGHT TIBIAL PLATEAU WITH ROUTINE HEALING, SUBSEQUENT ENCOUNTER: Primary | ICD-10-CM

## 2024-11-19 ENCOUNTER — OFFICE VISIT (OUTPATIENT)
Dept: OBGYN CLINIC | Facility: HOSPITAL | Age: 49
End: 2024-11-19

## 2024-11-19 ENCOUNTER — HOSPITAL ENCOUNTER (OUTPATIENT)
Dept: RADIOLOGY | Facility: HOSPITAL | Age: 49
Discharge: HOME | End: 2024-11-19
Attending: ORTHOPAEDIC SURGERY
Payer: COMMERCIAL

## 2024-11-19 VITALS
SYSTOLIC BLOOD PRESSURE: 134 MMHG | BODY MASS INDEX: 33.49 KG/M2 | DIASTOLIC BLOOD PRESSURE: 81 MMHG | HEIGHT: 67 IN | HEART RATE: 94 BPM

## 2024-11-19 DIAGNOSIS — Z98.890 S/P ORIF (OPEN REDUCTION INTERNAL FIXATION) FRACTURE: ICD-10-CM

## 2024-11-19 DIAGNOSIS — S82.141D CLOSED FRACTURE OF RIGHT TIBIAL PLATEAU WITH ROUTINE HEALING, SUBSEQUENT ENCOUNTER: ICD-10-CM

## 2024-11-19 DIAGNOSIS — Z87.81 S/P ORIF (OPEN REDUCTION INTERNAL FIXATION) FRACTURE: ICD-10-CM

## 2024-11-19 DIAGNOSIS — S82.141D CLOSED FRACTURE OF RIGHT TIBIAL PLATEAU WITH ROUTINE HEALING, SUBSEQUENT ENCOUNTER: Primary | ICD-10-CM

## 2024-11-19 PROCEDURE — 73590 X-RAY EXAM OF LOWER LEG: CPT

## 2024-11-19 PROCEDURE — 99024 POSTOP FOLLOW-UP VISIT: CPT | Performed by: ORTHOPAEDIC SURGERY

## 2024-11-19 NOTE — PROGRESS NOTES
Assessment:  1. Closed fracture of right tibial plateau with routine healing, subsequent encounter  Referral to Critical access hospital- [facility]      2. S/P ORIF (open reduction internal fixation) fracture  Referral to Critical access hospital- [facility]            Surgery: Open Reduction W/ Internal Fixation (orif) Tibia - Right  Date of Surgery: 9/8/2024        Discussion and Plan:   Patient is progressing nicely on exam today  Patient may progress to full WB  Home Health order placed for home PT, we will contact Andrei Saldaña to help facilitate, as patient is having difficulty getting a home PT to come to his home.  Patient will remain out of work until the next visit.     Follow Up:  Return in about 6 weeks (around 12/31/2024) for x-rays.        CHIEF COMPLAINT:  Chief Complaint   Patient presents with    Right Lower Leg - Post-op         SUBJECTIVE:  Asa Deng is a 49 y.o. year old male who presents for follow up after Open Reduction W/ Internal Fixation (orif) Tibia - Right. Today patient reports with the assistance of crutches today.  He states he has not been able to find home health services to come out to his house.      PHYSICAL EXAMINATION:  General: well developed and well nourished, alert, oriented times 3, and appears comfortable  Psychiatric: Normal    MUSCULOSKELETAL EXAMINATION:  Incision: Clean, dry, intact and healed  Surgery Site: normal, no evidence of infection   Range of Motion:  0-120  Neurovascular status: Neuro intact, good cap refill  Activity Restrictions:  crutches       I have personally reviewed pertinent films in PACS and my interpretation is as follows.  Right TIB_FIB x-rays demonstrates hardware in acceptable alignment and position with progressive healing.     Scribe Attestation      I,:  Gloria Yi MA am acting as a scribe while in the presence of the attending physician.:       I,:  Mando Canales MD personally performed the services described in this documentation    as scribed  in my presence.:

## 2024-12-11 DIAGNOSIS — S82.141D CLOSED FRACTURE OF RIGHT TIBIAL PLATEAU WITH ROUTINE HEALING, SUBSEQUENT ENCOUNTER: Primary | ICD-10-CM

## 2024-12-31 ENCOUNTER — HOSPITAL ENCOUNTER (OUTPATIENT)
Dept: RADIOLOGY | Facility: HOSPITAL | Age: 49
Discharge: HOME | End: 2024-12-31
Attending: ORTHOPAEDIC SURGERY
Payer: COMMERCIAL

## 2024-12-31 ENCOUNTER — OFFICE VISIT (OUTPATIENT)
Dept: OBGYN CLINIC | Facility: HOSPITAL | Age: 49
End: 2024-12-31
Payer: OTHER MISCELLANEOUS

## 2024-12-31 VITALS — WEIGHT: 213.85 LBS | BODY MASS INDEX: 33.56 KG/M2 | HEIGHT: 67 IN

## 2024-12-31 DIAGNOSIS — S82.141D CLOSED FRACTURE OF RIGHT TIBIAL PLATEAU WITH ROUTINE HEALING, SUBSEQUENT ENCOUNTER: ICD-10-CM

## 2024-12-31 DIAGNOSIS — S82.141D CLOSED FRACTURE OF RIGHT TIBIAL PLATEAU WITH ROUTINE HEALING, SUBSEQUENT ENCOUNTER: Primary | ICD-10-CM

## 2024-12-31 PROCEDURE — 99214 OFFICE O/P EST MOD 30 MIN: CPT | Performed by: ORTHOPAEDIC SURGERY

## 2024-12-31 PROCEDURE — 73590 X-RAY EXAM OF LOWER LEG: CPT

## 2024-12-31 RX ORDER — MELOXICAM 15 MG/1
15 TABLET ORAL DAILY
Qty: 30 TABLET | Refills: 1 | Status: SHIPPED | OUTPATIENT
Start: 2024-12-31

## 2024-12-31 RX ORDER — MELOXICAM 15 MG/1
15 TABLET ORAL DAILY
Qty: 30 TABLET | Refills: 0 | Status: SHIPPED | OUTPATIENT
Start: 2024-12-31 | End: 2024-12-31

## 2024-12-31 NOTE — PROGRESS NOTES
Assessment:   Diagnosis ICD-10-CM Associated Orders   1. Closed fracture of right tibial plateau with routine healing, subsequent encounter  S82.141D meloxicam (Mobic) 15 mg tablet          Plan:  49 y.o. male s/p ORIF of right tibial plateau on 9/8/2024.  I believe his ongoing pain is related to extensor mechanism weakness and he would benefit from a formal course of physical therapy.  Continue WBAT activities  Patient advised to start physical therapy, will reach out to physical therapist to try and establish patient with a physical therapy provider.  Rx for Meloxicam provided for analgesia  Follow up in 2 months.    The above stated was discussed in layman's terms and the patient expressed understanding.  All questions were answered to the patient's satisfaction.     To do next visit:  Return in about 2 months (around 2/28/2025).      Subjective:   Asa Deng is a 49 y.o. male who presents s/p ORIF of right tibial plateau on 9/8/2024. Since his last visit he has had anterior knee pain which has been interfering with his activities of daily living.  He particularly notes the pain with attempted knee extension, and when attempting to climb stairs.  He reports that he has run out of his naproxen and he is instead been taking Lodine for his pain with some relief.  Patient states he has been unable to start physical therapy due to an insurance issue related to Worker's Compensation.      Review of systems negative unless otherwise specified in HPI    Past Medical History:   Diagnosis Date    Diabetes mellitus (HCC)     Hypertension        Past Surgical History:   Procedure Laterality Date    ORIF TIBIA FRACTURE Right 9/8/2024    Procedure: OPEN REDUCTION W/ INTERNAL FIXATION (ORIF) TIBIA;  Surgeon: Mando Canales MD;  Location: [facility];  Service: Orthopedics       History reviewed. No pertinent family history.    Social History     Occupational History    Not on file   Tobacco Use    Smoking status: Never     Smokeless tobacco: Never   Vaping Use    Vaping status: Never Used   Substance and Sexual Activity    Alcohol use: Never    Drug use: Never    Sexual activity: Not on file         Current Outpatient Medications:     atorvastatin (LIPITOR) 20 mg tablet, Take 20 mg by mouth daily, Disp: , Rfl:     benazepril (LOTENSIN) 10 mg tablet, Take 10 mg by mouth daily, Disp: , Rfl:     Empagliflozin (Jardiance) 25 MG TABS, Take 25 mg by mouth every morning, Disp: , Rfl:     meloxicam (Mobic) 15 mg tablet, Take 1 tablet (15 mg total) by mouth daily, Disp: 30 tablet, Rfl: 1    pantoprazole (PROTONIX) 40 mg tablet, Take 40 mg by mouth daily, Disp: , Rfl:     sitaGLIPtin (JANUVIA) 100 mg tablet, Take 100 mg by mouth daily, Disp: , Rfl:     aspirin 81 mg chewable tablet, Chew 1 tablet (81 mg total) 2 (two) times a day for 28 days, Disp: 56 tablet, Rfl: 0    metFORMIN (GLUCOPHAGE) 1000 MG tablet, Take 1,000 mg by mouth 2 (two) times a day with meals (Patient not taking: Reported on 12/31/2024), Disp: , Rfl:     oxyCODONE (ROXICODONE) 5 immediate release tablet, Take 1 tablet (5 mg total) by mouth every 4 (four) hours as needed for severe pain for up to 15 doses Max Daily Amount: 30 mg (Patient not taking: Reported on 10/17/2024), Disp: 15 tablet, Rfl: 0    No Known Allergies       There were no vitals filed for this visit.    Objective:  Physical exam  General: Awake, Alert, Oriented  Eyes: Pupils equal, round and reactive to light  Heart: regular rate and rhythm  Lungs: No audible wheezing  Abdomen: soft    Ortho Exam  Surgical incisions are clean, dry, intact, and well-healed in appearance.  No evidence of erythema, induration, drainage, or other signs of infection.  Range of motion of knee from 0 to 120 degrees of flexion.  Tenderness over anterior knee and with resisted extension.  Sensation intact distally.  Extremity is warm and well-perfused with capillary refill less than 2 seconds.  Motor intact ankle  "plantarflexion/dorsiflexion, EHL/FHL.      Diagnostics, reviewed and taken today if performed as documented:    X-rays demonstrate hardware in good position and alignment without signs of loosening, failure, or subsidence.  No other evidence of new fracture, dislocation, or other acute osseous abnormality.      Procedures, if performed today:    Procedures    None performed    Portions of the record may have been created with voice recognition software.  Occasional wrong word or \"sound a like\" substitutions may have occurred due to the inherent limitations of voice recognition software.  Read the chart carefully and recognize, using context, where substitutions have occurred.        "

## 2025-01-03 ENCOUNTER — TELEPHONE (OUTPATIENT)
Age: 50
End: 2025-01-03

## 2025-01-03 NOTE — TELEPHONE ENCOUNTER
Caller: Spouse/[place]    Doctor: Parker    Reason for call: Request the order/referral to PT be mailed to their home. Verified home address is correct    Call back#: [phone]

## 2025-02-06 ENCOUNTER — TELEPHONE (OUTPATIENT)
Dept: OBGYN CLINIC | Facility: HOSPITAL | Age: 50
End: 2025-02-06

## 2025-02-06 NOTE — TELEPHONE ENCOUNTER
I called and left a voicemail for the patient asking him to give me a call back to reschedule his 3/4 appointment with Dr. Canales because he will unfortunately not be here.

## 2025-02-20 DIAGNOSIS — S82.141D CLOSED FRACTURE OF RIGHT TIBIAL PLATEAU WITH ROUTINE HEALING, SUBSEQUENT ENCOUNTER: Primary | ICD-10-CM

## 2025-03-04 ENCOUNTER — TELEPHONE (OUTPATIENT)
Dept: OBGYN CLINIC | Facility: HOSPITAL | Age: 50
End: 2025-03-04

## 2025-03-04 NOTE — TELEPHONE ENCOUNTER
GINGERM to the patient in regards to his appt tomorrow at 2:45 pm. Informed patient that Dr. Canales will not be in office and we will be switching his appt to Dr. Canales's PA Harris Braun. Provided office call back at [phone] if any questions or concerns.

## 2025-03-05 ENCOUNTER — HOSPITAL ENCOUNTER (OUTPATIENT)
Dept: RADIOLOGY | Facility: HOSPITAL | Age: 50
Discharge: HOME | End: 2025-03-05
Attending: ORTHOPAEDIC SURGERY
Payer: COMMERCIAL

## 2025-03-05 ENCOUNTER — OFFICE VISIT (OUTPATIENT)
Dept: OBGYN CLINIC | Facility: HOSPITAL | Age: 50
End: 2025-03-05
Payer: OTHER MISCELLANEOUS

## 2025-03-05 VITALS — HEIGHT: 67 IN | BODY MASS INDEX: 33.49 KG/M2

## 2025-03-05 DIAGNOSIS — S82.141D CLOSED FRACTURE OF RIGHT TIBIAL PLATEAU WITH ROUTINE HEALING, SUBSEQUENT ENCOUNTER: ICD-10-CM

## 2025-03-05 DIAGNOSIS — M25.561 PAIN OF RIGHT PATELLOFEMORAL JOINT: Primary | ICD-10-CM

## 2025-03-05 PROCEDURE — 73590 X-RAY EXAM OF LOWER LEG: CPT

## 2025-03-05 PROCEDURE — 99213 OFFICE O/P EST LOW 20 MIN: CPT | Performed by: PHYSICIAN ASSISTANT

## 2025-03-05 RX ORDER — MELOXICAM 15 MG/1
15 TABLET ORAL DAILY
Qty: 30 TABLET | Refills: 1 | Status: SHIPPED | OUTPATIENT
Start: 2025-03-05

## 2025-03-05 NOTE — PROGRESS NOTES
Assessment:  Assessment & Plan  Closed fracture of right tibial plateau with routine healing, subsequent encounter  Status post right tibial plateau ORIF performed on 9/8/2024.  His x-rays are stable and fracture is healed.  He may continue be weightbearing as tolerated on that side.  Orders:    meloxicam (Mobic) 15 mg tablet; Take 1 tablet (15 mg total) by mouth daily    Pain of right patellofemoral joint  Is pain today is located over the patellofemoral joint and with quadriceps loading  His knee is stable on exam today  Recommend formal physical therapy to address the patellofemoral pain.  Continue meloxicam.  He was given a note to stay out of work.  Will see him back in 8 weeks.  Consider weightbearing knee x-ray at the next visit.  Orders:    Ambulatory referral to Physical Therapy; Future         Plan:  Diagnostics reviewed and physical exam performed.  Diagnosis, treatment options and associated risks were discussed with the patient including no treatment, nonsurgical treatment and potential for surgical intervention.  The patient was given the opportunity to ask questions regarding each.      To do next visit:  Return in about 8 weeks (around 4/30/2025).    The above stated was discussed in layman's terms and the patient expressed understanding.  All questions were answered to the patient's satisfaction.         Subjective:   Asa Deng is a 49 y.o. male who presents status post right tibial plateau ORIF performed on 9/8/2024.  Patient is doing well in regards to his tibia feels like he has pain in the anterior knee with sitting and standing as well as going up and down steps.  Has been undergoing therapy.  There is a work-related injury.      Review of systems negative unless otherwise specified in HPI      Past Medical History:   Diagnosis Date    Diabetes mellitus (HCC)     Hypertension        Past Surgical History:   Procedure Laterality Date    ORIF TIBIA FRACTURE Right 9/8/2024    Procedure: OPEN  REDUCTION W/ INTERNAL FIXATION (ORIF) TIBIA;  Surgeon: Mando Canales MD;  Location: [facility];  Service: Orthopedics       History reviewed. No pertinent family history.    Social History     Occupational History    Not on file   Tobacco Use    Smoking status: Never    Smokeless tobacco: Never   Vaping Use    Vaping status: Never Used   Substance and Sexual Activity    Alcohol use: Never    Drug use: Never    Sexual activity: Not on file         Current Outpatient Medications:     atorvastatin (LIPITOR) 20 mg tablet, Take 20 mg by mouth daily, Disp: , Rfl:     benazepril (LOTENSIN) 10 mg tablet, Take 10 mg by mouth daily, Disp: , Rfl:     Empagliflozin (Jardiance) 25 MG TABS, Take 25 mg by mouth every morning, Disp: , Rfl:     meloxicam (Mobic) 15 mg tablet, Take 1 tablet (15 mg total) by mouth daily, Disp: 30 tablet, Rfl: 1    pantoprazole (PROTONIX) 40 mg tablet, Take 40 mg by mouth daily, Disp: , Rfl:     sitaGLIPtin (JANUVIA) 100 mg tablet, Take 100 mg by mouth daily, Disp: , Rfl:     aspirin 81 mg chewable tablet, Chew 1 tablet (81 mg total) 2 (two) times a day for 28 days, Disp: 56 tablet, Rfl: 0    metFORMIN (GLUCOPHAGE) 1000 MG tablet, Take 1,000 mg by mouth 2 (two) times a day with meals (Patient not taking: Reported on 3/5/2025), Disp: , Rfl:     oxyCODONE (ROXICODONE) 5 immediate release tablet, Take 1 tablet (5 mg total) by mouth every 4 (four) hours as needed for severe pain for up to 15 doses Max Daily Amount: 30 mg (Patient not taking: Reported on 3/5/2025), Disp: 15 tablet, Rfl: 0    No Known Allergies       There were no vitals filed for this visit.    Objective:                 Right knee:  Incisions well-healed  No tenderness over the lateral tibia or hardware  Knee range of motion 0 to 130 degrees with pain at end range of extension and flexion  Tender palpation over the quad tendon and patella  Positive patellar grind  No joint line tenderness  Knee stable to varus and valgus  Negative  "Lachman's    Diagnostics, reviewed and taken today if performed as documented:        The attending physician has personally reviewed the pertinent films in PACS and interpretation is as follows:  X-ray of the right tibia and fibula were taken in the office today.  These reviewed demonstrate intact hardware in stable alignment.  Healed fracture.          Portions of the record may have been created with voice recognition software.  Occasional wrong word or \"sound a like\" substitutions may have occurred due to the inherent limitations of voice recognition software.  Read the chart carefully and recognize, using context, where substitutions have occurred.  "

## 2025-03-05 NOTE — LETTER
March 5, 2025     Patient: Asa Deng  YOB: 1975  Date of Visit: 3/5/2025      To Whom it May Concern:    Asa Deng is under my professional care. Asa was seen in my office on 3/5/2025. Asa remains out of work at this time. We will see him back in 8 weeks.    If you have any questions or concerns, please don't hesitate to call.         Sincerely,          Harris Braun PA-C        CC: No Recipients

## 2025-03-05 NOTE — ASSESSMENT & PLAN NOTE
Status post right tibial plateau ORIF performed on 9/8/2024.  His x-rays are stable and fracture is healed.  He may continue be weightbearing as tolerated on that side.  Orders:    meloxicam (Mobic) 15 mg tablet; Take 1 tablet (15 mg total) by mouth daily

## 2025-03-05 NOTE — ASSESSMENT & PLAN NOTE
Is pain today is located over the patellofemoral joint and with quadriceps loading  His knee is stable on exam today  Recommend formal physical therapy to address the patellofemoral pain.  Continue meloxicam.  He was given a note to stay out of work.  Will see him back in 8 weeks.  Consider weightbearing knee x-ray at the next visit.  Orders:    Ambulatory referral to Physical Therapy; Future

## 2025-04-21 DIAGNOSIS — S82.141D CLOSED FRACTURE OF RIGHT TIBIAL PLATEAU WITH ROUTINE HEALING, SUBSEQUENT ENCOUNTER: Primary | ICD-10-CM

## 2025-05-05 ENCOUNTER — TELEPHONE (OUTPATIENT)
Age: 50
End: 2025-05-05

## 2025-05-05 NOTE — TELEPHONE ENCOUNTER
Worker Comp. Info was added to the patients chart.    Claim # [id number]  DOI: 9/8/2024  Adjustor: Ana Luisa Stephens  [phone]

## 2025-05-05 NOTE — TELEPHONE ENCOUNTER
Caller: Patient     Doctor: Dr. Canales    Reason for call: Patient is requesting a call back regarding why now is the office asking him to bring in his WC info.  This is the same WC since last Sept. 2024      # [phone]    Call back#: [phone]

## 2025-05-06 ENCOUNTER — HOSPITAL ENCOUNTER (OUTPATIENT)
Dept: RADIOLOGY | Facility: HOSPITAL | Age: 50
Discharge: HOME | End: 2025-05-06
Attending: ORTHOPAEDIC SURGERY
Payer: COMMERCIAL

## 2025-05-06 ENCOUNTER — OFFICE VISIT (OUTPATIENT)
Dept: OBGYN CLINIC | Facility: HOSPITAL | Age: 50
End: 2025-05-06
Payer: OTHER MISCELLANEOUS

## 2025-05-06 VITALS — HEIGHT: 67 IN | BODY MASS INDEX: 33.49 KG/M2

## 2025-05-06 DIAGNOSIS — M23.91 INTERNAL DERANGEMENT OF RIGHT KNEE: ICD-10-CM

## 2025-05-06 DIAGNOSIS — S82.141D CLOSED FRACTURE OF RIGHT TIBIAL PLATEAU WITH ROUTINE HEALING, SUBSEQUENT ENCOUNTER: ICD-10-CM

## 2025-05-06 DIAGNOSIS — S82.141D CLOSED FRACTURE OF RIGHT TIBIAL PLATEAU WITH ROUTINE HEALING, SUBSEQUENT ENCOUNTER: Primary | ICD-10-CM

## 2025-05-06 PROCEDURE — 73560 X-RAY EXAM OF KNEE 1 OR 2: CPT

## 2025-05-06 PROCEDURE — 99213 OFFICE O/P EST LOW 20 MIN: CPT | Performed by: ORTHOPAEDIC SURGERY

## 2025-05-06 NOTE — ASSESSMENT & PLAN NOTE
Patient has an examination worrisome for meniscal pathology, given their lack of improvement with physical therapy he is indicated at this time for an MRI scan to evaluate for surgical pathology.  We will review the results of the study when it has been obtained and discuss further treatment options.  Pending results of the MRI was will consider referral to Dr. Pang  Orders:    XR knee 1 or 2 vw right; Future    MRI knee right  wo contrast; Future

## 2025-05-06 NOTE — PROGRESS NOTES
"  Assessment & Plan  Closed fracture of right tibial plateau with routine healing, subsequent encounter  Patient has an examination worrisome for meniscal pathology, given their lack of improvement with physical therapy he is indicated at this time for an MRI scan to evaluate for surgical pathology.  We will review the results of the study when it has been obtained and discuss further treatment options.  Pending results of the MRI was will consider referral to Dr. Pang  Orders:    XR knee 1 or 2 vw right; Future    MRI knee right  wo contrast; Future    Internal derangement of right knee    Orders:    MRI knee right  wo contrast; Future      Subjective:   Patient ID: Asa Deng is a 49 y.o. male      HPI    Asa Deng is a 49 y.o. male who presents 8 mos status post right tibial plateau ORIF performed on 9/8/2024.  Patient reports increased right knee pain.  Symptoms worsened around April 2025.  He denies any new injuries.  He reports medial, lateral and anterior knee pain.  He is attending PT as instructed.       The following portions of the patient's history were reviewed and updated as appropriate: allergies, current medications, past family history, past medical history, past social history, past surgical history and problem list.    Review of Systems   Constitutional:  Negative for chills and fever.   HENT:  Negative for drooling and hearing loss.    Eyes:  Negative for visual disturbance.   Respiratory:  Negative for cough and shortness of breath.    Cardiovascular:  Negative for chest pain.   Gastrointestinal:  Negative for abdominal pain.   Skin:  Negative for rash.   Psychiatric/Behavioral:  Negative for agitation.        Objective:  Ht 5' 7\" (1.702 m)   BMI 33.49 kg/m²       Right Knee Exam     Tenderness   The patient is experiencing tenderness in the medial joint line.    Tests   Juani:  Medial - positive Lateral - negative  Varus: negative Valgus: negative    Other   Erythema: " absent  Sensation: normal  Pulse: present  Swelling: none  Effusion: no effusion present            Physical Exam  Vitals reviewed.   Constitutional:       Appearance: He is well-developed.   HENT:      Head: Normocephalic.   Eyes:      Pupils: Pupils are equal, round, and reactive to light.   Pulmonary:      Effort: Pulmonary effort is normal.   Abdominal:      General: Abdomen is flat. There is no distension.   Musculoskeletal:      Right knee: No effusion.      Instability Tests: Medial Juani test positive. Lateral Juani test negative.   Skin:     General: Skin is warm and dry.           I have personally reviewed pertinent films in PACS and my interpretation is as follows.    X-ray of the right knee were taken in the office today. These reviewed demonstrate intact hardware in stable alignment. Healed fracture.       Scribe Attestation      I,:  Gloria Yi MA am acting as a scribe while in the presence of the attending physician.:       I,:  Mando Canales MD personally performed the services described in this documentation    as scribed in my presence.:

## 2025-05-07 ENCOUNTER — TELEPHONE (OUTPATIENT)
Dept: OBGYN CLINIC | Facility: HOSPITAL | Age: 50
End: 2025-05-07

## 2025-05-07 DIAGNOSIS — M25.561 PAIN OF RIGHT PATELLOFEMORAL JOINT: Primary | ICD-10-CM

## 2025-05-07 NOTE — TELEPHONE ENCOUNTER
Caller: SANJUANITA Steele    Doctor: Dr. Canales    Reason for call: requesting physical therapy order script     Please advise     Call back#: [phone]    Fax#: [phone]

## 2025-05-17 ENCOUNTER — HOSPITAL ENCOUNTER (OUTPATIENT)
Dept: RADIOLOGY | Facility: HOSPITAL | Age: 50
Discharge: HOME | End: 2025-05-17
Attending: ORTHOPAEDIC SURGERY
Payer: OTHER MISCELLANEOUS

## 2025-05-17 DIAGNOSIS — M23.91 INTERNAL DERANGEMENT OF RIGHT KNEE: ICD-10-CM

## 2025-05-17 DIAGNOSIS — S82.141D CLOSED FRACTURE OF RIGHT TIBIAL PLATEAU WITH ROUTINE HEALING, SUBSEQUENT ENCOUNTER: ICD-10-CM

## 2025-05-17 PROCEDURE — 73721 MRI JNT OF LWR EXTRE W/O DYE: CPT

## 2025-06-10 ENCOUNTER — OFFICE VISIT (OUTPATIENT)
Dept: OBGYN CLINIC | Facility: CLINIC | Age: 50
End: 2025-06-10
Payer: OTHER MISCELLANEOUS

## 2025-06-10 VITALS — BODY MASS INDEX: 33.43 KG/M2 | HEIGHT: 67 IN | WEIGHT: 213 LBS

## 2025-06-10 DIAGNOSIS — Z87.81 S/P ORIF (OPEN REDUCTION INTERNAL FIXATION) FRACTURE: ICD-10-CM

## 2025-06-10 DIAGNOSIS — S83.241A OTHER TEAR OF MEDIAL MENISCUS, CURRENT INJURY, RIGHT KNEE, INITIAL ENCOUNTER: Primary | ICD-10-CM

## 2025-06-10 DIAGNOSIS — Z98.890 S/P ORIF (OPEN REDUCTION INTERNAL FIXATION) FRACTURE: ICD-10-CM

## 2025-06-10 DIAGNOSIS — E11.9 TYPE 2 DIABETES MELLITUS WITHOUT COMPLICATION, WITHOUT LONG-TERM CURRENT USE OF INSULIN (HCC): ICD-10-CM

## 2025-06-10 DIAGNOSIS — M25.561 PAIN OF RIGHT KNEE AFTER INJURY: ICD-10-CM

## 2025-06-10 PROCEDURE — 99214 OFFICE O/P EST MOD 30 MIN: CPT | Performed by: ORTHOPAEDIC SURGERY

## 2025-06-10 RX ORDER — CHLORHEXIDINE GLUCONATE ORAL RINSE 1.2 MG/ML
15 SOLUTION DENTAL ONCE
OUTPATIENT
Start: 2025-06-10 | End: 2025-06-10

## 2025-06-10 RX ORDER — SODIUM CHLORIDE, SODIUM LACTATE, POTASSIUM CHLORIDE, CALCIUM CHLORIDE 600; 310; 30; 20 MG/100ML; MG/100ML; MG/100ML; MG/100ML
20 INJECTION, SOLUTION INTRAVENOUS CONTINUOUS
OUTPATIENT
Start: 2025-06-10

## 2025-06-10 NOTE — PROGRESS NOTES
"Patient Name: Asa Deng      : 1975       MRN: [id number]   Encounter Provider: Natanael Armenta MD   Encounter Date: 25  Encounter department: [facility]         Assessment & Plan  Other tear of medial meniscus, current injury, right knee, initial encounter  S/P ORIF (open reduction internal fixation) fracture  Pain of right knee after injury  Right tibial plateau ORIF with Dr Canales 24, due to injury/fracture sustained at work  MRI reviewed with patient and family demonstrating medial meniscal root tear.   Risks and benefits of arthroscopic surgery with meniscus repair discussed at length.  Pre and post operative expectations reviewed.  Patient provided T-ROM today, he already has crutches from prior surgery.       Orders:  •  T-Rom  Post Op Knee Brace  •  Case request operating room: Right knee arthroscopy, meniscal root repair, all indicated procedures, possible removal of hardware right tibia; Standing  •  Comprehensive metabolic panel; Future  •  CBC and Platelet; Future  •  APTT; Future  •  Protime-INR; Future  •  HEMOGLOBIN A1C W/ EAG ESTIMATION; Future  •  EKG 12 lead; Future    Type 2 diabetes mellitus without complication, without long-term current use of insulin ([facility])    No results found for: \"HGBA1C\"            Plan:  Asa is a pleasant 49 y.o. who presents for evaluation in my office as a referral from Dr Canales after right tibial plateau ORIF, 24. Upon review of the MRI, a thorough history and my examination, Asa is presenting with signs and symptoms consistent with medial meniscal root tear. Etiology and treatment options discussed, all his questions were addressed.  We did have a long discussion about further treatment options.  We discussed nonoperative treatment with continued physical therapy, home exercises, injections and activity modification.  At this time he is done extensive physical therapy, home exercises, " anti-inflammatories.  We did discussed that meniscal root tears can have a high risk of arthritis and conversion to arthroplasty with and without surgery however without surgery they are much higher.  Likely does not have extensive extrusion of his meniscus at this time.  Most likely this injury may have occurred at the time of his tibial plateau fracture given the amount of energy that need to go through his knee joint to cause a tibial plateau fracture.  We discussed that the MRI is not of great quality given the need for metal subtraction but there appears to be enough evidence on MRI to suggest there is a meniscal root tear.  We discussed surgical intervention in the form of arthroscopy and medial meniscal root repair and possible hardware removal if needed for tunnel placement.  We discussed surgical intervention in these is more of a salvage procedure to try to save the joint to decrease the need or delay arthroplasty.  We discussed the significant risks associated with surgery.  Pre and post operative expectations discussed at length. Consent was signed in clinic. He will meet with my surgery scheduler today. Patient was provided T-ROM brace today to be worn post operatively.  After surgery he will be limited weightbearing for about 6 weeks.  He will likely be out of work for 3 to 4 months given his job.      Given that the patient has failed conservative treatment and continues to have severe pain and/or dysfunction that limits their activities of daily living, they would like to proceed with operative intervention.  We discussed risks, benefits and alternatives to surgery today in the clinic. We discussed with the patient the risks of no treatment, non-operative treatment, and operative treatment. The risks of operative intervention were discussed and include but are not limited to: Infection, bleeding, stiffness, loss of range of motion, blood clot, failure of surgery, fracture, delayed union, nonunion,  "malunion, risk of potential future arthritis, continued problems with swelling, injury to surrounding structures/nerve/artery/vein, recurrence of cysts, failure of hardware, retained hardware and/or foreign body, symptomatic hardware, and continued instability, pain, dysfunction, or disability despite repair.     Specifically, for     Right knee arthroscopy with medial meniscal root repair and all necessary procedures: Risk of DVT, risk of knee stiffness, risk of recurrent meniscal tear or failure to heal the tear.  Risk of arthritis needing arthroplasty in the future.  We also discussed the need to remove some screws if the tunnel gets interfered by with the previous screws.  He does have a diabetes and will be at slightly high risk for infection and high risk that the meniscus does not heal.        Follow-up:  No follow-ups on file.     _____________________________________________________  CHIEF COMPLAINT:  Chief Complaint   Patient presents with   • Right Knee - Pain         SUBJECTIVE:  Asa Deng is a 49 y.o. male who presents for evaluation of right knee. Patient was referred to my office by Dr Canales. He underwent a right tibial plateau ORIF with Dr Ramos 9/8/24.  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 Due to ongoing pain about the knee an MRI was ordered recently which demonstrates medial meniscus root tear. Patient notes pain diffuse to his knee aggravated with activity.     PAST MEDICAL HISTORY:  Past Medical History[1]    PAST SURGICAL HISTORY:  Past Surgical History[2]    FAMILY HISTORY:  Family History[3]    SOCIAL HISTORY:  Social History[4]    MEDICATIONS:  Current Medications[5]    ALLERGIES:  Allergies[6]    LABS:  HgA1c: No results found for: \"HGBA1C\"  BMP:   Lab Results   Component Value Date    CALCIUM 8.5 09/10/2024    K 3.8 09/10/2024    CO2 25 09/10/2024    CL 99 09/10/2024    BUN 25 09/10/2024    CREATININE 0.89 09/10/2024 " "    CBC: No components found for: \"CBC\"    _____________________________________________________  Review of Systems   Constitutional:  Negative for chills and fever.   HENT:  Negative for ear pain and sore throat.    Eyes:  Negative for pain and visual disturbance.   Respiratory:  Negative for cough and shortness of breath.    Cardiovascular:  Negative for chest pain and palpitations.   Gastrointestinal:  Negative for abdominal pain and vomiting.   Genitourinary:  Negative for dysuria and hematuria.   Musculoskeletal:  Negative for arthralgias and back pain.   Skin:  Negative for color change and rash.   Neurological:  Negative for seizures and syncope.   All other systems reviewed and are negative.       Right Knee Exam     Tenderness   The patient is experiencing tenderness in the medial joint line.    Range of Motion   Extension:  0   Flexion:  120     Tests   Juani:  Medial - positive Lateral - negative  Varus: negative Valgus: negative  Drawer:  Anterior - negative    Posterior - negative    Other   Erythema: absent  Sensation: normal  Pulse: present  Swelling: mild  Effusion: no effusion present    Comments:  Knee flexor and extensor mechanism intact  Patella tracks centrally  Knee is stable to stress on exam   Well healed scar  Skin is warm and dry to touch with no signs of erythema, ecchymosis, or infection   Calf compartments are soft and supple  2+ DP and PT pulses with brisk capillary refill to the toes  Sural, saphenous, tibial, superficial, and deep peroneal motor and sensory distributions intact  Sensation light touch intact distally               Physical Exam  Vitals and nursing note reviewed.   Constitutional:       Appearance: Normal appearance.   HENT:      Head: Normocephalic and atraumatic.      Right Ear: External ear normal.      Left Ear: External ear normal.     Eyes:      Extraocular Movements: Extraocular movements intact.      Conjunctiva/sclera: Conjunctivae normal. "       Cardiovascular:      Rate and Rhythm: Normal rate.      Pulses: Normal pulses.   Pulmonary:      Effort: Pulmonary effort is normal.     Musculoskeletal:         General: Normal range of motion.      Cervical back: Normal range of motion and neck supple.      Right knee: No effusion.      Instability Tests: Medial Juani test positive. Lateral Juani test negative.      Comments: See ortho exam     Skin:     General: Skin is warm and dry.     Neurological:      General: No focal deficit present.      Mental Status: He is alert.     Psychiatric:         Behavior: Behavior normal.        _____________________________________________________  STUDIES REVIEWED:  I personally reviewed the pertinent images and my independent interpretation is as follows:  MRI of right knee obtained 5/17/25 demonstrates radial tear at the root of the medial meniscus. No peripheral extrusion.    PROCEDURES PERFORMED:  No procedures performed today.    Scribe Attestation    I,:  Colleen Bianchi am acting as a scribe while in the presence of the attending physician.:       I,:  Natanael Armenta MD personally performed the services described in this documentation    as scribed in my presence.:                    [1]  Past Medical History:  Diagnosis Date   • Diabetes mellitus (HCC)    • Hypertension    [2]  Past Surgical History:  Procedure Laterality Date   • ORIF TIBIA FRACTURE Right 9/8/2024    Procedure: OPEN REDUCTION W/ INTERNAL FIXATION (ORIF) TIBIA;  Surgeon: Mando Canales MD;  Location: [facility];  Service: Orthopedics   [3]  No family history on file.[4]  Social History  Tobacco Use   • Smoking status: Never   • Smokeless tobacco: Never   Vaping Use   • Vaping status: Never Used   Substance Use Topics   • Alcohol use: Never   • Drug use: Never   [5]    Current Outpatient Medications:   •  atorvastatin (LIPITOR) 20 mg tablet, Take 20 mg by mouth in the morning., Disp: , Rfl:   •  benazepril (LOTENSIN) 10 mg tablet,  Take 10 mg by mouth in the morning., Disp: , Rfl:   •  Empagliflozin (Jardiance) 25 MG TABS, Take 25 mg by mouth every morning, Disp: , Rfl:   •  meloxicam (Mobic) 15 mg tablet, Take 1 tablet (15 mg total) by mouth daily, Disp: 30 tablet, Rfl: 1  •  metFORMIN (GLUCOPHAGE) 1000 MG tablet, Take 1,000 mg by mouth in the morning and 1,000 mg in the evening. Take with meals., Disp: , Rfl:   •  pantoprazole (PROTONIX) 40 mg tablet, Take 40 mg by mouth in the morning., Disp: , Rfl:   •  sitaGLIPtin (JANUVIA) 100 mg tablet, Take 100 mg by mouth in the morning., Disp: , Rfl:   •  aspirin 81 mg chewable tablet, Chew 1 tablet (81 mg total) 2 (two) times a day for 28 days, Disp: 56 tablet, Rfl: 0  •  oxyCODONE (ROXICODONE) 5 immediate release tablet, Take 1 tablet (5 mg total) by mouth every 4 (four) hours as needed for severe pain for up to 15 doses Max Daily Amount: 30 mg (Patient not taking: Reported on 6/10/2025), Disp: 15 tablet, Rfl: 0[6]  No Known Allergies

## 2025-06-10 NOTE — ASSESSMENT & PLAN NOTE
Right tibial plateau ORIF with Dr Canales 9/8/24, due to injury/fracture sustained at work  MRI reviewed with patient and family demonstrating medial meniscal root tear.   Risks and benefits of arthroscopic surgery with meniscus repair discussed at length.  Pre and post operative expectations reviewed.  Patient provided T-ROM today, he already has crutches from prior surgery.       Orders:  •  T-Rom  Post Op Knee Brace  •  Case request operating room: Right knee arthroscopy, meniscal root repair, all indicated procedures, possible removal of hardware right tibia; Standing  •  Comprehensive metabolic panel; Future  •  CBC and Platelet; Future  •  APTT; Future  •  Protime-INR; Future  •  HEMOGLOBIN A1C W/ EAG ESTIMATION; Future  •  EKG 12 lead; Future

## 2025-06-10 NOTE — H&P (VIEW-ONLY)
"Patient Name: Asa Deng      : 1975       MRN: [id number]   Encounter Provider: Natanael Armenta MD   Encounter Date: 25  Encounter department: [facility]         Assessment & Plan  Other tear of medial meniscus, current injury, right knee, initial encounter  S/P ORIF (open reduction internal fixation) fracture  Pain of right knee after injury  Right tibial plateau ORIF with Dr Canales 24, due to injury/fracture sustained at work  MRI reviewed with patient and family demonstrating medial meniscal root tear.   Risks and benefits of arthroscopic surgery with meniscus repair discussed at length.  Pre and post operative expectations reviewed.  Patient provided T-ROM today, he already has crutches from prior surgery.       Orders:  •  T-Rom  Post Op Knee Brace  •  Case request operating room: Right knee arthroscopy, meniscal root repair, all indicated procedures, possible removal of hardware right tibia; Standing  •  Comprehensive metabolic panel; Future  •  CBC and Platelet; Future  •  APTT; Future  •  Protime-INR; Future  •  HEMOGLOBIN A1C W/ EAG ESTIMATION; Future  •  EKG 12 lead; Future    Type 2 diabetes mellitus without complication, without long-term current use of insulin (formerly [facility])    No results found for: \"HGBA1C\"            Plan:  Asa is a pleasant 49 y.o. who presents for evaluation in my office as a referral from Dr Canales after right tibial plateau ORIF, 24. Upon review of the MRI, a thorough history and my examination, Asa is presenting with signs and symptoms consistent with medial meniscal root tear. Etiology and treatment options discussed, all his questions were addressed.  We did have a long discussion about further treatment options.  We discussed nonoperative treatment with continued physical therapy, home exercises, injections and activity modification.  At this time he is done extensive physical therapy, home exercises, " anti-inflammatories.  We did discussed that meniscal root tears can have a high risk of arthritis and conversion to arthroplasty with and without surgery however without surgery they are much higher.  Likely does not have extensive extrusion of his meniscus at this time.  Most likely this injury may have occurred at the time of his tibial plateau fracture given the amount of energy that need to go through his knee joint to cause a tibial plateau fracture.  We discussed that the MRI is not of great quality given the need for metal subtraction but there appears to be enough evidence on MRI to suggest there is a meniscal root tear.  We discussed surgical intervention in the form of arthroscopy and medial meniscal root repair and possible hardware removal if needed for tunnel placement.  We discussed surgical intervention in these is more of a salvage procedure to try to save the joint to decrease the need or delay arthroplasty.  We discussed the significant risks associated with surgery.  Pre and post operative expectations discussed at length. Consent was signed in clinic. He will meet with my surgery scheduler today. Patient was provided T-ROM brace today to be worn post operatively.  After surgery he will be limited weightbearing for about 6 weeks.  He will likely be out of work for 3 to 4 months given his job.      Given that the patient has failed conservative treatment and continues to have severe pain and/or dysfunction that limits their activities of daily living, they would like to proceed with operative intervention.  We discussed risks, benefits and alternatives to surgery today in the clinic. We discussed with the patient the risks of no treatment, non-operative treatment, and operative treatment. The risks of operative intervention were discussed and include but are not limited to: Infection, bleeding, stiffness, loss of range of motion, blood clot, failure of surgery, fracture, delayed union, nonunion,  "malunion, risk of potential future arthritis, continued problems with swelling, injury to surrounding structures/nerve/artery/vein, recurrence of cysts, failure of hardware, retained hardware and/or foreign body, symptomatic hardware, and continued instability, pain, dysfunction, or disability despite repair.     Specifically, for     Right knee arthroscopy with medial meniscal root repair and all necessary procedures: Risk of DVT, risk of knee stiffness, risk of recurrent meniscal tear or failure to heal the tear.  Risk of arthritis needing arthroplasty in the future.  We also discussed the need to remove some screws if the tunnel gets interfered by with the previous screws.  He does have a diabetes and will be at slightly high risk for infection and high risk that the meniscus does not heal.        Follow-up:  No follow-ups on file.     _____________________________________________________  CHIEF COMPLAINT:  Chief Complaint   Patient presents with   • Right Knee - Pain         SUBJECTIVE:  Asa Deng is a 49 y.o. male who presents for evaluation of right knee. Patient was referred to my office by Dr Canales. He underwent a right tibial plateau ORIF with Dr Ramos 9/8/24.  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 Due to ongoing pain about the knee an MRI was ordered recently which demonstrates medial meniscus root tear. Patient notes pain diffuse to his knee aggravated with activity.     PAST MEDICAL HISTORY:  Past Medical History[1]    PAST SURGICAL HISTORY:  Past Surgical History[2]    FAMILY HISTORY:  Family History[3]    SOCIAL HISTORY:  Social History[4]    MEDICATIONS:  Current Medications[5]    ALLERGIES:  Allergies[6]    LABS:  HgA1c: No results found for: \"HGBA1C\"  BMP:   Lab Results   Component Value Date    CALCIUM 8.5 09/10/2024    K 3.8 09/10/2024    CO2 25 09/10/2024    CL 99 09/10/2024    BUN 25 09/10/2024    CREATININE 0.89 09/10/2024 " "    CBC: No components found for: \"CBC\"    _____________________________________________________  Review of Systems   Constitutional:  Negative for chills and fever.   HENT:  Negative for ear pain and sore throat.    Eyes:  Negative for pain and visual disturbance.   Respiratory:  Negative for cough and shortness of breath.    Cardiovascular:  Negative for chest pain and palpitations.   Gastrointestinal:  Negative for abdominal pain and vomiting.   Genitourinary:  Negative for dysuria and hematuria.   Musculoskeletal:  Negative for arthralgias and back pain.   Skin:  Negative for color change and rash.   Neurological:  Negative for seizures and syncope.   All other systems reviewed and are negative.       Right Knee Exam     Tenderness   The patient is experiencing tenderness in the medial joint line.    Range of Motion   Extension:  0   Flexion:  120     Tests   Juani:  Medial - positive Lateral - negative  Varus: negative Valgus: negative  Drawer:  Anterior - negative    Posterior - negative    Other   Erythema: absent  Sensation: normal  Pulse: present  Swelling: mild  Effusion: no effusion present    Comments:  Knee flexor and extensor mechanism intact  Patella tracks centrally  Knee is stable to stress on exam   Well healed scar  Skin is warm and dry to touch with no signs of erythema, ecchymosis, or infection   Calf compartments are soft and supple  2+ DP and PT pulses with brisk capillary refill to the toes  Sural, saphenous, tibial, superficial, and deep peroneal motor and sensory distributions intact  Sensation light touch intact distally               Physical Exam  Vitals and nursing note reviewed.   Constitutional:       Appearance: Normal appearance.   HENT:      Head: Normocephalic and atraumatic.      Right Ear: External ear normal.      Left Ear: External ear normal.     Eyes:      Extraocular Movements: Extraocular movements intact.      Conjunctiva/sclera: Conjunctivae normal. "       Cardiovascular:      Rate and Rhythm: Normal rate.      Pulses: Normal pulses.   Pulmonary:      Effort: Pulmonary effort is normal.     Musculoskeletal:         General: Normal range of motion.      Cervical back: Normal range of motion and neck supple.      Right knee: No effusion.      Instability Tests: Medial Juani test positive. Lateral Juani test negative.      Comments: See ortho exam     Skin:     General: Skin is warm and dry.     Neurological:      General: No focal deficit present.      Mental Status: He is alert.     Psychiatric:         Behavior: Behavior normal.        _____________________________________________________  STUDIES REVIEWED:  I personally reviewed the pertinent images and my independent interpretation is as follows:  MRI of right knee obtained 5/17/25 demonstrates radial tear at the root of the medial meniscus. No peripheral extrusion.    PROCEDURES PERFORMED:  No procedures performed today.    Scribe Attestation    I,:  Colleen Bianchi am acting as a scribe while in the presence of the attending physician.:       I,:  Natanael Armenta MD personally performed the services described in this documentation    as scribed in my presence.:                    [1]  Past Medical History:  Diagnosis Date   • Diabetes mellitus (HCC)    • Hypertension    [2]  Past Surgical History:  Procedure Laterality Date   • ORIF TIBIA FRACTURE Right 9/8/2024    Procedure: OPEN REDUCTION W/ INTERNAL FIXATION (ORIF) TIBIA;  Surgeon: Mando Canales MD;  Location: [facility];  Service: Orthopedics   [3]  No family history on file.[4]  Social History  Tobacco Use   • Smoking status: Never   • Smokeless tobacco: Never   Vaping Use   • Vaping status: Never Used   Substance Use Topics   • Alcohol use: Never   • Drug use: Never   [5]    Current Outpatient Medications:   •  atorvastatin (LIPITOR) 20 mg tablet, Take 20 mg by mouth in the morning., Disp: , Rfl:   •  benazepril (LOTENSIN) 10 mg tablet,  Take 10 mg by mouth in the morning., Disp: , Rfl:   •  Empagliflozin (Jardiance) 25 MG TABS, Take 25 mg by mouth every morning, Disp: , Rfl:   •  meloxicam (Mobic) 15 mg tablet, Take 1 tablet (15 mg total) by mouth daily, Disp: 30 tablet, Rfl: 1  •  metFORMIN (GLUCOPHAGE) 1000 MG tablet, Take 1,000 mg by mouth in the morning and 1,000 mg in the evening. Take with meals., Disp: , Rfl:   •  pantoprazole (PROTONIX) 40 mg tablet, Take 40 mg by mouth in the morning., Disp: , Rfl:   •  sitaGLIPtin (JANUVIA) 100 mg tablet, Take 100 mg by mouth in the morning., Disp: , Rfl:   •  aspirin 81 mg chewable tablet, Chew 1 tablet (81 mg total) 2 (two) times a day for 28 days, Disp: 56 tablet, Rfl: 0  •  oxyCODONE (ROXICODONE) 5 immediate release tablet, Take 1 tablet (5 mg total) by mouth every 4 (four) hours as needed for severe pain for up to 15 doses Max Daily Amount: 30 mg (Patient not taking: Reported on 6/10/2025), Disp: 15 tablet, Rfl: 0[6]  No Known Allergies

## 2025-06-18 ENCOUNTER — OFFICE VISIT (OUTPATIENT)
Dept: LAB | Facility: HOSPITAL | Age: 50
End: 2025-06-18
Attending: ORTHOPAEDIC SURGERY
Payer: COMMERCIAL

## 2025-06-18 ENCOUNTER — APPOINTMENT (OUTPATIENT)
Dept: LAB | Facility: HOSPITAL | Age: 50
End: 2025-06-18
Payer: COMMERCIAL

## 2025-06-18 DIAGNOSIS — S83.241A OTHER TEAR OF MEDIAL MENISCUS, CURRENT INJURY, RIGHT KNEE, INITIAL ENCOUNTER: ICD-10-CM

## 2025-06-18 DIAGNOSIS — Z98.890 S/P ORIF (OPEN REDUCTION INTERNAL FIXATION) FRACTURE: ICD-10-CM

## 2025-06-18 DIAGNOSIS — Z87.81 S/P ORIF (OPEN REDUCTION INTERNAL FIXATION) FRACTURE: ICD-10-CM

## 2025-06-18 LAB
ALBUMIN SERPL BCG-MCNC: 4.5 G/DL (ref 3.5–5)
ALP SERPL-CCNC: 42 U/L (ref 34–104)
ALT SERPL W P-5'-P-CCNC: 47 U/L (ref 7–52)
ANION GAP SERPL CALCULATED.3IONS-SCNC: 12 MMOL/L (ref 4–13)
APTT PPP: 23 SECONDS (ref 23–34)
AST SERPL W P-5'-P-CCNC: 31 U/L (ref 13–39)
BILIRUB SERPL-MCNC: 0.82 MG/DL (ref 0.2–1)
BUN SERPL-MCNC: 17 MG/DL (ref 5–25)
CALCIUM SERPL-MCNC: 9.3 MG/DL (ref 8.4–10.2)
CHLORIDE SERPL-SCNC: 104 MMOL/L (ref 96–108)
CO2 SERPL-SCNC: 23 MMOL/L (ref 21–32)
CREAT SERPL-MCNC: 0.88 MG/DL (ref 0.6–1.3)
ERYTHROCYTE [DISTWIDTH] IN BLOOD BY AUTOMATED COUNT: 13.2 % (ref 11.6–15.1)
GFR SERPL CREATININE-BSD FRML MDRD: 102 ML/MIN/1.73SQ M
GLUCOSE SERPL-MCNC: 127 MG/DL (ref 65–140)
HCT VFR BLD AUTO: 48 % (ref 36.5–49.3)
HGB BLD-MCNC: 16.1 G/DL (ref 12–17)
INR PPP: 0.98 (ref 0.85–1.19)
MCH RBC QN AUTO: 27.8 PG (ref 26.8–34.3)
MCHC RBC AUTO-ENTMCNC: 33.5 G/DL (ref 31.4–37.4)
MCV RBC AUTO: 83 FL (ref 82–98)
PLATELET # BLD AUTO: 206 THOUSANDS/UL (ref 149–390)
PMV BLD AUTO: 9.9 FL (ref 8.9–12.7)
POTASSIUM SERPL-SCNC: 3.9 MMOL/L (ref 3.5–5.3)
PROT SERPL-MCNC: 7.3 G/DL (ref 6.4–8.4)
PROTHROMBIN TIME: 13.5 SECONDS (ref 12.3–15)
RBC # BLD AUTO: 5.79 MILLION/UL (ref 3.88–5.62)
SODIUM SERPL-SCNC: 139 MMOL/L (ref 135–147)
WBC # BLD AUTO: 7.33 THOUSAND/UL (ref 4.31–10.16)

## 2025-06-18 PROCEDURE — 85730 THROMBOPLASTIN TIME PARTIAL: CPT

## 2025-06-18 PROCEDURE — 83036 HEMOGLOBIN GLYCOSYLATED A1C: CPT

## 2025-06-18 PROCEDURE — 80053 COMPREHEN METABOLIC PANEL: CPT

## 2025-06-18 PROCEDURE — 36415 COLL VENOUS BLD VENIPUNCTURE: CPT

## 2025-06-18 PROCEDURE — 93005 ELECTROCARDIOGRAM TRACING: CPT

## 2025-06-18 PROCEDURE — 85610 PROTHROMBIN TIME: CPT

## 2025-06-18 PROCEDURE — 85027 COMPLETE CBC AUTOMATED: CPT

## 2025-06-19 LAB
ATRIAL RATE: 93 BPM
EST. AVERAGE GLUCOSE BLD GHB EST-MCNC: 171 MG/DL
HBA1C MFR BLD: 7.6 %
P AXIS: 56 DEGREES
PR INTERVAL: 164 MS
QRS AXIS: 73 DEGREES
QRSD INTERVAL: 94 MS
QT INTERVAL: 342 MS
QTC INTERVAL: 426 MS
T WAVE AXIS: -1 DEGREES
VENTRICULAR RATE: 93 BPM

## 2025-06-19 PROCEDURE — 93010 ELECTROCARDIOGRAM REPORT: CPT | Performed by: INTERNAL MEDICINE

## 2025-06-19 NOTE — PRE-PROCEDURE INSTRUCTIONS
Pre-Surgery Instructions:   Medication Instructions    atorvastatin (LIPITOR) 20 mg tablet Take day of surgery.    benazepril (LOTENSIN) 10 mg tablet Hold day of surgery.    Empagliflozin (Jardiance) 25 MG TABS Stop taking 4 days prior to surgery. Last dose 6/25/25    meloxicam (Mobic) 15 mg tablet Stop taking 3 days prior to surgery. Last dose 6/26/25    metFORMIN (GLUCOPHAGE) 1000 MG tablet Hold day of surgery.    pantoprazole (PROTONIX) 40 mg tablet Take day of surgery.    sitaGLIPtin (JANUVIA) 100 mg tablet Hold day of surgery.   Medication instructions for day of surgery reviewed. Please take all instructed medications with only a sip of water. Please do not take any over the counter (non-prescribed) vitamins or supplements for one week prior to date of surgery.      You will receive a call one business day prior to surgery with an arrival time and hospital directions. If your surgery is scheduled on a Monday, the hospital will be calling you on the Friday prior to your surgery. If you have not heard from anyone by 8pm, please call the hospital supervisor through the hospital  at [phone]. ([place] [phone] or [place] [phone]).    Do not eat or drink anything after midnight the night before your surgery, including candy, mints, lifesavers, or chewing gum. Do not drink alcohol 24hrs before your surgery. Try not to smoke at least 24hrs before your surgery.       Follow the pre surgery showering instructions as listed in the “My Surgical Experience Booklet” or otherwise provided by your surgeon's office. Do not use a blade to shave the surgical area 1 week before surgery. It is okay to use a clean electric clippers up to 24 hours before surgery. Do not apply any lotions, creams, including makeup, cologne, deodorant, or perfumes after showering on the day of your surgery. Do not use dry shampoo, hair spray, hair gel, or any type of hair products.     No contact lenses, eye make-up, or  artificial eyelashes. Remove nail polish, including gel polish, and any artificial, gel, or acrylic nails if possible. Remove all jewelry including rings and body piercing jewelry.     Wear causal clothing that is easy to take on and off. Consider your type of surgery.    Keep any valuables, jewelry, piercings at home. Please bring any specially ordered equipment (sling, braces) if indicated.    Arrange for a responsible person to drive you to and from the hospital on the day of your surgery. Please confirm the visitor policy for the day of your procedure when you receive your phone call with an arrival time.     Call the surgeon's office with any new illnesses, exposures, or additional questions prior to surgery.    Please reference your “My Surgical Experience Booklet” for additional information to prepare for your upcoming surgery.

## 2025-06-26 ENCOUNTER — ANESTHESIA EVENT (OUTPATIENT)
Dept: PERIOP | Facility: HOSPITAL | Age: 50
End: 2025-06-26
Payer: OTHER MISCELLANEOUS

## 2025-06-30 ENCOUNTER — HOSPITAL ENCOUNTER (OUTPATIENT)
Facility: HOSPITAL | Age: 50
Setting detail: OUTPATIENT SURGERY
Discharge: HOME | End: 2025-06-30
Attending: ORTHOPAEDIC SURGERY | Admitting: ORTHOPAEDIC SURGERY
Payer: OTHER MISCELLANEOUS

## 2025-06-30 ENCOUNTER — APPOINTMENT (OUTPATIENT)
Dept: RADIOLOGY | Facility: HOSPITAL | Age: 50
End: 2025-06-30
Payer: OTHER MISCELLANEOUS

## 2025-06-30 ENCOUNTER — ANESTHESIA (OUTPATIENT)
Dept: PERIOP | Facility: HOSPITAL | Age: 50
End: 2025-06-30
Payer: OTHER MISCELLANEOUS

## 2025-06-30 VITALS
OXYGEN SATURATION: 95 % | TEMPERATURE: 98.4 F | WEIGHT: 211.42 LBS | BODY MASS INDEX: 33.18 KG/M2 | HEART RATE: 80 BPM | SYSTOLIC BLOOD PRESSURE: 146 MMHG | RESPIRATION RATE: 14 BRPM | HEIGHT: 67 IN | DIASTOLIC BLOOD PRESSURE: 87 MMHG

## 2025-06-30 DIAGNOSIS — Z47.89 AFTERCARE FOLLOWING SURGERY OF THE MUSCULOSKELETAL SYSTEM: ICD-10-CM

## 2025-06-30 DIAGNOSIS — Z98.890 S/P MEDIAL MENISCUS REPAIR OF RIGHT KNEE: ICD-10-CM

## 2025-06-30 DIAGNOSIS — S83.241A ACUTE MEDIAL MENISCUS TEAR OF RIGHT KNEE, INITIAL ENCOUNTER: Primary | ICD-10-CM

## 2025-06-30 LAB — GLUCOSE SERPL-MCNC: 162 MG/DL (ref 65–140)

## 2025-06-30 PROCEDURE — 82948 REAGENT STRIP/BLOOD GLUCOSE: CPT

## 2025-06-30 PROCEDURE — 29877 ARTHRS KNEE SURG DBRDMT/SHVG: CPT | Performed by: ORTHOPAEDIC SURGERY

## 2025-06-30 RX ORDER — ACETAMINOPHEN 500 MG
1000 TABLET ORAL EVERY 8 HOURS
Qty: 60 TABLET | Refills: 0 | Status: SHIPPED | OUTPATIENT
Start: 2025-06-30

## 2025-06-30 RX ORDER — ONDANSETRON 2 MG/ML
4 INJECTION INTRAMUSCULAR; INTRAVENOUS EVERY 6 HOURS PRN
Status: CANCELLED | OUTPATIENT
Start: 2025-06-30

## 2025-06-30 RX ORDER — ONDANSETRON 2 MG/ML
INJECTION INTRAMUSCULAR; INTRAVENOUS AS NEEDED
Status: DISCONTINUED | OUTPATIENT
Start: 2025-06-30 | End: 2025-06-30

## 2025-06-30 RX ORDER — MIDAZOLAM HYDROCHLORIDE 2 MG/2ML
INJECTION, SOLUTION INTRAMUSCULAR; INTRAVENOUS
Status: COMPLETED | OUTPATIENT
Start: 2025-06-30 | End: 2025-06-30

## 2025-06-30 RX ORDER — ONDANSETRON 4 MG/1
4 TABLET, FILM COATED ORAL EVERY 8 HOURS PRN
Qty: 20 TABLET | Refills: 0 | Status: SHIPPED | OUTPATIENT
Start: 2025-06-30

## 2025-06-30 RX ORDER — CHLORHEXIDINE GLUCONATE ORAL RINSE 1.2 MG/ML
15 SOLUTION DENTAL ONCE
Status: COMPLETED | OUTPATIENT
Start: 2025-06-30 | End: 2025-06-30

## 2025-06-30 RX ORDER — KETOROLAC TROMETHAMINE 30 MG/ML
INJECTION, SOLUTION INTRAMUSCULAR; INTRAVENOUS AS NEEDED
Status: DISCONTINUED | OUTPATIENT
Start: 2025-06-30 | End: 2025-06-30

## 2025-06-30 RX ORDER — SODIUM CHLORIDE, SODIUM LACTATE, POTASSIUM CHLORIDE, CALCIUM CHLORIDE 600; 310; 30; 20 MG/100ML; MG/100ML; MG/100ML; MG/100ML
INJECTION, SOLUTION INTRAVENOUS CONTINUOUS PRN
Status: DISCONTINUED | OUTPATIENT
Start: 2025-06-30 | End: 2025-06-30

## 2025-06-30 RX ORDER — ONDANSETRON 2 MG/ML
4 INJECTION INTRAMUSCULAR; INTRAVENOUS ONCE AS NEEDED
Status: DISCONTINUED | OUTPATIENT
Start: 2025-06-30 | End: 2025-06-30 | Stop reason: HOSPADM

## 2025-06-30 RX ORDER — BUPIVACAINE HYDROCHLORIDE 5 MG/ML
INJECTION, SOLUTION EPIDURAL; INTRACAUDAL; PERINEURAL
Status: COMPLETED | OUTPATIENT
Start: 2025-06-30 | End: 2025-06-30

## 2025-06-30 RX ORDER — CEFAZOLIN SODIUM 2 G/50ML
2000 SOLUTION INTRAVENOUS ONCE
Status: COMPLETED | OUTPATIENT
Start: 2025-06-30 | End: 2025-06-30

## 2025-06-30 RX ORDER — HYDROMORPHONE HCL/PF 1 MG/ML
0.5 SYRINGE (ML) INJECTION
Status: DISCONTINUED | OUTPATIENT
Start: 2025-06-30 | End: 2025-06-30 | Stop reason: HOSPADM

## 2025-06-30 RX ORDER — ASPIRIN 325 MG
325 TABLET ORAL DAILY
Qty: 42 TABLET | Refills: 0 | Status: SHIPPED | OUTPATIENT
Start: 2025-06-30

## 2025-06-30 RX ORDER — PROPOFOL 10 MG/ML
INJECTION, EMULSION INTRAVENOUS AS NEEDED
Status: DISCONTINUED | OUTPATIENT
Start: 2025-06-30 | End: 2025-06-30

## 2025-06-30 RX ORDER — FENTANYL CITRATE/PF 50 MCG/ML
50 SYRINGE (ML) INJECTION
Status: DISCONTINUED | OUTPATIENT
Start: 2025-06-30 | End: 2025-06-30 | Stop reason: HOSPADM

## 2025-06-30 RX ORDER — MAGNESIUM HYDROXIDE 1200 MG/15ML
LIQUID ORAL AS NEEDED
Status: DISCONTINUED | OUTPATIENT
Start: 2025-06-30 | End: 2025-06-30 | Stop reason: HOSPADM

## 2025-06-30 RX ORDER — SCOPOLAMINE 1 MG/3D
1 PATCH, EXTENDED RELEASE TRANSDERMAL ONCE
Status: DISCONTINUED | OUTPATIENT
Start: 2025-06-30 | End: 2025-06-30 | Stop reason: HOSPADM

## 2025-06-30 RX ORDER — SENNA AND DOCUSATE SODIUM 50; 8.6 MG/1; MG/1
1 TABLET, FILM COATED ORAL DAILY
Qty: 14 TABLET | Refills: 0 | Status: SHIPPED | OUTPATIENT
Start: 2025-06-30 | End: 2025-07-14

## 2025-06-30 RX ORDER — SODIUM CHLORIDE, SODIUM LACTATE, POTASSIUM CHLORIDE, CALCIUM CHLORIDE 600; 310; 30; 20 MG/100ML; MG/100ML; MG/100ML; MG/100ML
125 INJECTION, SOLUTION INTRAVENOUS CONTINUOUS
Status: DISCONTINUED | OUTPATIENT
Start: 2025-06-30 | End: 2025-06-30 | Stop reason: HOSPADM

## 2025-06-30 RX ORDER — SODIUM CHLORIDE, SODIUM LACTATE, POTASSIUM CHLORIDE, CALCIUM CHLORIDE 600; 310; 30; 20 MG/100ML; MG/100ML; MG/100ML; MG/100ML
100 INJECTION, SOLUTION INTRAVENOUS CONTINUOUS
Status: CANCELLED | OUTPATIENT
Start: 2025-06-30

## 2025-06-30 RX ORDER — DEXAMETHASONE SODIUM PHOSPHATE 10 MG/ML
INJECTION, SOLUTION INTRAMUSCULAR; INTRAVENOUS AS NEEDED
Status: DISCONTINUED | OUTPATIENT
Start: 2025-06-30 | End: 2025-06-30

## 2025-06-30 RX ORDER — NAPROXEN 500 MG/1
500 TABLET ORAL 2 TIMES DAILY WITH MEALS
Qty: 20 TABLET | Refills: 0 | Status: SHIPPED | OUTPATIENT
Start: 2025-06-30

## 2025-06-30 RX ORDER — SODIUM CHLORIDE, SODIUM LACTATE, POTASSIUM CHLORIDE, CALCIUM CHLORIDE 600; 310; 30; 20 MG/100ML; MG/100ML; MG/100ML; MG/100ML
20 INJECTION, SOLUTION INTRAVENOUS CONTINUOUS
Status: DISCONTINUED | OUTPATIENT
Start: 2025-06-30 | End: 2025-06-30 | Stop reason: HOSPADM

## 2025-06-30 RX ORDER — OXYCODONE HYDROCHLORIDE 5 MG/1
5 TABLET ORAL EVERY 6 HOURS PRN
Qty: 10 TABLET | Refills: 0 | Status: SHIPPED | OUTPATIENT
Start: 2025-06-30

## 2025-06-30 RX ORDER — LIDOCAINE HYDROCHLORIDE 10 MG/ML
INJECTION, SOLUTION EPIDURAL; INFILTRATION; INTRACAUDAL; PERINEURAL AS NEEDED
Status: DISCONTINUED | OUTPATIENT
Start: 2025-06-30 | End: 2025-06-30

## 2025-06-30 RX ORDER — FENTANYL CITRATE 50 UG/ML
INJECTION, SOLUTION INTRAMUSCULAR; INTRAVENOUS AS NEEDED
Status: DISCONTINUED | OUTPATIENT
Start: 2025-06-30 | End: 2025-06-30

## 2025-06-30 RX ORDER — ACETAMINOPHEN 325 MG/1
650 TABLET ORAL EVERY 6 HOURS PRN
Status: CANCELLED | OUTPATIENT
Start: 2025-06-30

## 2025-06-30 RX ORDER — OXYCODONE HYDROCHLORIDE 5 MG/1
5 TABLET ORAL EVERY 4 HOURS PRN
Status: CANCELLED | OUTPATIENT
Start: 2025-06-30

## 2025-06-30 RX ADMIN — DEXAMETHASONE SODIUM PHOSPHATE 10 MG: 10 INJECTION, SOLUTION INTRAMUSCULAR; INTRAVENOUS at 16:01

## 2025-06-30 RX ADMIN — CEFAZOLIN SODIUM 2000 MG: 2 SOLUTION INTRAVENOUS at 16:04

## 2025-06-30 RX ADMIN — SODIUM CHLORIDE, SODIUM LACTATE, POTASSIUM CHLORIDE, AND CALCIUM CHLORIDE 125 ML/HR: .6; .31; .03; .02 INJECTION, SOLUTION INTRAVENOUS at 13:46

## 2025-06-30 RX ADMIN — PROPOFOL 75 MCG/KG/MIN: 10 INJECTION, EMULSION INTRAVENOUS at 16:01

## 2025-06-30 RX ADMIN — SODIUM CHLORIDE, SODIUM LACTATE, POTASSIUM CHLORIDE, AND CALCIUM CHLORIDE: .6; .31; .03; .02 INJECTION, SOLUTION INTRAVENOUS at 14:39

## 2025-06-30 RX ADMIN — SCOPOLAMINE 1 PATCH: 1.5 PATCH, EXTENDED RELEASE TRANSDERMAL at 14:01

## 2025-06-30 RX ADMIN — PROPOFOL 200 MG: 10 INJECTION, EMULSION INTRAVENOUS at 16:00

## 2025-06-30 RX ADMIN — FENTANYL CITRATE 50 MCG: 50 INJECTION, SOLUTION INTRAMUSCULAR; INTRAVENOUS at 16:17

## 2025-06-30 RX ADMIN — BUPIVACAINE 10 ML: 13.3 INJECTION, SUSPENSION, LIPOSOMAL INFILTRATION at 15:50

## 2025-06-30 RX ADMIN — LIDOCAINE HYDROCHLORIDE 50 MG: 10 INJECTION, SOLUTION EPIDURAL; INFILTRATION; INTRACAUDAL; PERINEURAL at 16:00

## 2025-06-30 RX ADMIN — BUPIVACAINE HYDROCHLORIDE 10 ML: 5 INJECTION, SOLUTION EPIDURAL; INTRACAUDAL; PERINEURAL at 15:50

## 2025-06-30 RX ADMIN — CHLORHEXIDINE GLUCONATE 15 ML: 1.2 RINSE ORAL at 13:43

## 2025-06-30 RX ADMIN — FENTANYL CITRATE 50 MCG: 50 INJECTION, SOLUTION INTRAMUSCULAR; INTRAVENOUS at 16:00

## 2025-06-30 RX ADMIN — ONDANSETRON 4 MG: 2 INJECTION INTRAMUSCULAR; INTRAVENOUS at 16:01

## 2025-06-30 RX ADMIN — MIDAZOLAM 2 MG: 1 INJECTION INTRAMUSCULAR; INTRAVENOUS at 15:45

## 2025-06-30 RX ADMIN — KETOROLAC TROMETHAMINE 30 MG: 30 INJECTION, SOLUTION INTRAMUSCULAR; INTRAVENOUS at 16:34

## 2025-06-30 NOTE — DISCHARGE INSTR - AVS FIRST PAGE
Postoperative Instructions Following Knee Surgery    MEDICATIONS:  Resume all home medications unless otherwise instructed by your surgeon.  Pain Medication:   Take Tylenol and Naproxen on prescribed schedule for 7 days  Take Oxycodone as needed for severe pain   If you were given a regional anesthetic (nerve block), it is helpful to take your pain medication before the block wear off.    Possible side effects include nausea, constipation, and urinary retention.  If you experience these side effects, please call our office for assistance.  Pain med refills are authorized only during office hours (8am-4pm, Mon-Fri).  Blood Clot Prevention:   Pump your foot up and down 20 times per hour while you are less mobile.  Ambulate with your crutches at least once every hour   Take Aspirin 325 mg daily for 4 weeks after surgery    WOUND CARE:  Keep the dressing clean and dry.  Light drainage may occur the first 2 days postop.  You may remove the dressings and get the incision wet in the shower 72 hours after surgery.  DO NOT remove steri-strips or sutures.  DO NOT immerse the incision under water.  Carefully pat the incision dry.  If there is wound drainage, re-apply a fresh dry gauze dressing.  Please call our office ([phone]) if you experience either of the following:  Sudden increase in swelling, redness, or warmth at the surgical site  Excessive incisional drainage that persists beyond the 3rd day after surgery  Oral temperature greater than 101 degrees, not relieved with Tylenol  Shortness of breath, chest pain, nausea, or any other concerning symptoms    Other pain/swelling control measures:  Cold Therapy:  Apply ice (20 min on, 20 min off) as often as you feel is necessary. Ice helps with pain.   Elevation:  Elevate the entire leg above heart level.  Place pillows under your ankle to keep your knee straight. This will help with swelling and prevents stiffness   Compression:  Keep an ace wrap on the operative leg  "until you return to the office. It may be removed to shower as described above.     RANGE OF MOTION:  You are allowed FULL RANGE OF MOTION of the knee    IMMOBILIZATION:  No immobilization needed    ACTIVITY:   WEIGHT BEARING AS TOLERATED  Using Crutches on Stairs:  Going up, lead with your \"good\" (nonoperative) leg.  Going down, lead with your \"bad\" (operative) leg.  Use a hand rail when available.  Knee Extension:  Place a rolled towel or pillow under your ankle for 20-30 minutes 3-5 times per day.  This will help to maintain full knee extension.  Quad Sets:  Sit or lie with your knee straight.  Tighten your quadriceps (front thigh) muscle.  Hold for 3 seconds, then relax.  Repeat 20 times per hour while awake.    PHYSICAL THERAPY:  Begin therapy 3 to 7 DAYS AFTER SURGERY.  You were given a prescription for therapy at your preoperative office visit or on the day of surgery.  If you do not have physical therapy scheduled yet, please call our office for assistance.      FOLLOW-UP APPOINTMENT:  10-14 days with:    Dr. Natanael Armenta MD    [facility] [place] [address], [address]  [place] [id number]    Phone:   [phone]   "

## 2025-06-30 NOTE — ANESTHESIA PREPROCEDURE EVALUATION
Procedure:  Right knee arthroscopy, meniscal root repair, all indicated procedures, possible removal of hardware right tibia (Right: Knee)    Relevant Problems   ANESTHESIA (within normal limits)      CARDIO   (+) Primary hypertension      ENDO   (+) Type 2 diabetes mellitus without complication, without long-term current use of insulin (HCC)      PULMONARY (within normal limits)        Physical Exam    Airway     Mallampati score: II  TM Distance: >3 FB  Neck ROM: full  Mouth opening: >= 4 cm      Cardiovascular  Rhythm: regular, Rate: normal, Pulse is palpable.     Dental   No notable dental hx     Pulmonary   Breath sounds clear to auscultation    Neurological    He appears awake, alert and oriented x3.      Other Findings        Anesthesia Plan  ASA Score- 2     Anesthesia Type- general with ASA Monitors.         Additional Monitors:     Airway Plan: LMA and LMA.           Plan Factors-Exercise tolerance (METS): >4 METS.    Chart reviewed.   Existing labs reviewed. Patient summary reviewed.    Patient is not a current smoker.              Induction-     Postoperative Plan- .   Monitoring Plan - Monitoring plan - standard ASA monitoring  Post Operative Pain Plan - non-opiod analgesics        Informed Consent- Anesthetic plan and risks discussed with patient.  I personally reviewed this patient with the CRNA. Discussed and agreed on the Anesthesia Plan with the CRNA..      NPO Status:  Vitals Value Taken Time   Date of last liquid 06/30/25 06/30/25 13:33   Time of last liquid 0430 06/30/25 13:33   Date of last solid 06/29/25 06/30/25 13:33   Time of last solid 2000 06/30/25 13:33

## 2025-06-30 NOTE — ANESTHESIA POSTPROCEDURE EVALUATION
Post-Op Assessment Note    CV Status:  Stable  Pain Score: 2    Pain management: adequate       Mental Status:  Alert and awake   Hydration Status:  Euvolemic   PONV Controlled:  Controlled   Airway Patency:  Patent     Post Op Vitals Reviewed: Yes    No anethesia notable event occurred.    Staff: Anesthesiologist           Last Filed PACU Vitals:  Vitals Value Taken Time   Temp 98.4 °F (36.9 °C) 06/30/25 16:50   Pulse 80 06/30/25 17:15   /87 06/30/25 17:15   Resp 14 06/30/25 17:15   SpO2 95 % 06/30/25 17:15       Modified Josh:     Vitals Value Taken Time   Activity 2 06/30/25 17:12   Respiration 2 06/30/25 17:12   Circulation 2 06/30/25 17:12   Consciousness 2 06/30/25 17:12   Oxygen Saturation 2 06/30/25 17:12     Modified Josh Score: 10

## 2025-06-30 NOTE — ANESTHESIA PROCEDURE NOTES
Peripheral Block    Patient location during procedure: holding area  Start time: 6/30/2025 3:50 PM  Reason for block: at surgeon's request and post-op pain management  Staffing  Performed by: Stephane Hurtado MD  Authorized by: Stephane Hurtado MD    Preanesthetic Checklist  Completed: patient identified, IV checked, site marked, risks and benefits discussed, surgical consent, monitors and equipment checked, pre-op evaluation and timeout performed  Peripheral Block  Prep: ChloraPrep  Patient monitoring: frequent blood pressure checks, continuous pulse oximetry and heart rate  Block type: Adductor Canal  Laterality: right  Injection technique: single-shot  Procedures: ultrasound guided, Ultrasound guidance required for the procedure to increase accuracy and safety of medication placement and decrease risk of complications.  Ultrasound permanent image saved  bupivacaine (PF) (MARCAINE) 0.5 % injection 20 mL - Perineural   10 mL - 6/30/2025 3:50:00 PM  bupivacaine liposomal (EXPAREL) 1.3 % injection 20 mL - Perineural   10 mL - 6/30/2025 3:50:00 PM  midazolam (VERSED) injection 0.5 mg - Intravenous   2 mg - 6/30/2025 3:45:00 PM  Needle  Needle type: Stimuplex   Needle gauge: 20 G  Needle length: 4 in  Needle localization: anatomical landmarks and ultrasound guidance  Needle insertion depth: 6 cm  Assessment  Injection assessment: incremental injection, frequent aspiration, injected with ease, negative aspiration, negative for heart rate change, no paresthesia on injection, no symptoms of intraneural/intravenous injection and needle tip visualized at all times  Paresthesia pain: none  Post-procedure:  site cleaned  patient tolerated the procedure well with no immediate complications

## 2025-06-30 NOTE — OP NOTE
OPERATIVE REPORT  PATIENT NAME: Asa Deng    :  1975  MRN: [id number]  Pt Location: [facility] OR ROOM 01    SURGERY DATE: 2025    Surgeons and Role:     * Natanael Armenta MD - Primary    Preop Diagnosis:  Other tear of medial meniscus, current injury, right knee, initial encounter [S83.241A]  S/P ORIF (open reduction internal fixation) fracture [Z98.890, Z87.81]    Post-Op Diagnosis Codes:     * Other tear of medial meniscus, current injury, right knee, initial encounter [S83.241A]     * S/P ORIF (open reduction internal fixation) fracture [Z98.890, Z87.81]    Procedure(s):  Right - Right knee arthroscopy with chondroplasty of patella, medial and lateral compartment    Specimen(s):  * No specimens in log *    Estimated Blood Loss:   Minimal    Drains:  * No LDAs found *    Anesthesia Type:   General    Operative Indications:  Other tear of medial meniscus, current injury, right knee, initial encounter [S83.241A]  S/P ORIF (open reduction internal fixation) fracture [Z98.890, Z87.81]  Asa is a pleasant 49 y.o. who presents for evaluation in my office as a referral from Dr Canales after right tibial plateau ORIF, 24. Upon review of the MRI, a thorough history and my examination, Asa is presenting with signs and symptoms consistent with medial meniscal root tear. Etiology and treatment options discussed, all his questions were addressed.  We did have a long discussion about further treatment options.  We discussed nonoperative treatment with continued physical therapy, home exercises, injections and activity modification.  At this time he is done extensive physical therapy, home exercises, anti-inflammatories.  We did discussed that meniscal root tears can have a high risk of arthritis and conversion to arthroplasty with and without surgery however without surgery they are much higher.  Likely does not have extensive extrusion of his meniscus at this time.  Most likely this injury may have  occurred at the time of his tibial plateau fracture given the amount of energy that need to go through his knee joint to cause a tibial plateau fracture.  We discussed that the MRI is not of great quality given the need for metal subtraction but there appears to be enough evidence on MRI to suggest there is a meniscal root tear.  We discussed surgical intervention in the form of arthroscopy and medial meniscal root repair and possible hardware removal if needed for tunnel placement.  We discussed surgical intervention in these is more of a salvage procedure to try to save the joint to decrease the need or delay arthroplasty.  We discussed the significant risks associated with surgery.  Pre and post operative expectations discussed at length. Consent was signed in clinic. He will meet with my surgery scheduler today. Patient was provided T-ROM brace today to be worn post operatively.  After surgery he will be limited weightbearing for about 6 weeks.  He will likely be out of work for 3 to 4 months given his job.        Given that the patient has failed conservative treatment and continues to have severe pain and/or dysfunction that limits their activities of daily living, they would like to proceed with operative intervention.  We discussed risks, benefits and alternatives to surgery today in the clinic. We discussed with the patient the risks of no treatment, non-operative treatment, and operative treatment. The risks of operative intervention were discussed and include but are not limited to: Infection, bleeding, stiffness, loss of range of motion, blood clot, failure of surgery, fracture, delayed union, nonunion, malunion, risk of potential future arthritis, continued problems with swelling, injury to surrounding structures/nerve/artery/vein, recurrence of cysts, failure of hardware, retained hardware and/or foreign body, symptomatic hardware, and continued instability, pain, dysfunction, or disability despite  repair.      Specifically, for      Right knee arthroscopy with medial meniscal root repair and all necessary procedures: Risk of DVT, risk of knee stiffness, risk of recurrent meniscal tear or failure to heal the tear.  Risk of arthritis needing arthroplasty in the future.  We also discussed the need to remove some screws if the tunnel gets interfered by with the previous screws.  He does have a diabetes and will be at slightly high risk for infection and high risk that the meniscus does not heal.    Operative Findings:  See below       Complications:   None    Procedure and Technique:       Findings:      Arthroscopic evaluation of the right knee revealed the following:     Medial meniscus: Intact including meniscal root with no instability.  Medial femoral condyle: Grade 2 and grade 3 changes with cartilage fraying  Medial tibial plateau: Grade 3 changes with several areas of fraying and unstable cartilage  Anterior cruciate ligament: Normal appearance.  Posterior cruciate ligament: Normal appearance.   Lateral meniscus: Intact  Lateral femoral condyle: Grade 1 changes  Lateral tibial plateau: Grade 2 changes at the more medial aspect of the lateral tibial plateau with well-fixed tibial plateau fracture  Medial and lateral gutters: No loose bodies.   Patella: Grade 3 changes with several areas of frayed and unstable cartilage causing impingement  Trochlea: Grade 1 changes         Procedure:  In the pre-operative holding area, the patient identified the correct operative extremity and I marked that extremity with my initials.  2 g of Ancef were given preoperatively the patient was then brought to the operating room and positioned supine.  A preoperative operative adductor canal nerve block was performed.  A well-padded tourniquet was placed on the right upper thigh following satisfactory induction of anesthesia, the right knee was prepped and draped in the usual sterile fashion for surgical arthroscopy of the  right knee. Before any surgical instrumentation was passed to me by the surgical technician, a formalized time-out occurred, which involves the surgeon, circulating nurse, and anesthesia staff all verifying the correct operative extremity. My initials were visible on the prepped and draped operative field.      The anatomic landmarks of the anteromedial and anterolateral portals were marked. The anterolateral portal was established with a #11 blade. The arthroscope was introduced through this portal. Under direct visualization, the anteromedial portal was established with a localizing needle followed by a scalpel. A probe was then introduced into the anteromedial portal. A systematic diagnostic arthroscopy evaluated the following:  medial compartment, notch, lateral compartment, patellofemoral compartment, medial gutter, and lateral gutter.      The medial meniscal root was thoroughly inspected and found to be intact with no instability.  He did have significant cartilage degeneration with several areas of unstable cartilage fraying.  We started in the patellofemoral compartment where a well-balanced chondroplasty was performed using arthroscopic shaver.  There is no full-thickness chondral loss after debridement.  We then went to the lateral compartment where the tibial plateau cartilage fraying was debrided using an arthroscopic shaver.  Finally went to the lateral compartment where there was global grade 3 changes with fraying and unstable cartilage.  A chondroplasty was performed in this compartment as well is an arthroscopic shaver.     There was no additional pathology. All particulate debris was removed. The knee was copiously rinsed and then drained. The portals were closed with an interrupted 3-0 nylon suture. The skin was cleansed with sterile saline and dried before Steri-Strips were applied. Finally, a sterile dressing was secured by Webril and an Ace wrap, followed by a hinged knee brace locked in  extension.        I was present for the entire procedure.    Patient Disposition:  PACU          SIGNATURE: Natanael Armenta MD  DATE: June 30, 2025  TIME: 7:49 PM      The patient be weightbearing as tolerated.  P on aspirin for DVT prophylaxis.  He may start with physical therapy for full range of motion and full strengthening.  Will follow-up in 2 weeks for repeat evaluation and wound check.  I did discuss the findings with his family postoperatively and his significant arthritis present.  Discussed possible injections in the future.

## 2025-06-30 NOTE — ANESTHESIA POSTPROCEDURE EVALUATION
Post-Op Assessment Note    CV Status:  Stable  Pain Score: 0    Pain management: adequate       Mental Status:  Sleepy and arousable   Hydration Status:  Euvolemic   PONV Controlled:  Controlled   Airway Patency:  Patent     Post Op Vitals Reviewed: Yes    No anethesia notable event occurred.    Staff: Anesthesiologist, CRNA           Last Filed PACU Vitals:  Vitals Value Taken Time   Temp 98.4 °F (36.9 °C) 06/30/25 16:50   Pulse 98 06/30/25 16:50   /88 06/30/25 16:50   Resp 12 06/30/25 16:50   SpO2 93 % 06/30/25 16:50

## 2025-07-03 ENCOUNTER — TELEPHONE (OUTPATIENT)
Age: 50
End: 2025-07-03

## 2025-07-03 NOTE — TELEPHONE ENCOUNTER
Caller: Dagmar    Doctor: Dr. Armenta    Reason for call: Patient needs the PT order placed in his MYC so It can be printed to take to PT outside of [facility]  Please advise  Thank you  Call back#: [phone]

## 2025-07-15 VITALS — BODY MASS INDEX: 33.12 KG/M2 | WEIGHT: 211 LBS | HEIGHT: 67 IN

## 2025-07-15 DIAGNOSIS — Z98.890 S/P RIGHT KNEE ARTHROSCOPY: Primary | ICD-10-CM

## 2025-07-15 PROCEDURE — 99024 POSTOP FOLLOW-UP VISIT: CPT | Performed by: ORTHOPAEDIC SURGERY

## 2025-07-15 NOTE — PROGRESS NOTES
Patient Name: Asa Degn      : 1975       MRN: [id number]   Encounter Provider: Natanael Armenta MD   Encounter Date: 25  Encounter department: [facility] [place]         Assessment & Plan  S/P right knee arthroscopy  Right knee arthroscopy with chondroplasty, DOS 25  History of right tibial plateau ORIF with Dr Canales 24  Sutures removed in clinic.   WBAT  Continue aspirin DVT prophylaxis  Continue with PT per protocol   Follow up 2 weeks           Plan:  Asa is 2 weeks S/P right knee arthroscopy with chondroplasty. DOS 25. History of right tibial plateau ORIF with Dr Canales 24 due to injury/fracture sustained at work. This is a workman's compensation case. Patient is doing well, incision clean, dry, and intact. Sutures removed in clinic, steri strips applied. Patient can get wet, pat dry, do not scrub, do not soak. He is starting physical therapy today, I encourage him to continue his efforts per protocol. Continue weight bearing as tolerated. Continue aspirin for DVT prophylaxis. Discussed possible corticosteroid injection in 2 weeks if pain has not improved.  At this time he continues to be out of work given his job constraints.    Follow-up:  Return in about 2 weeks (around 2025).     _____________________________________________________  CHIEF COMPLAINT:  Chief Complaint   Patient presents with   • Right Knee - Post-op         SUBJECTIVE:  Asa Deng is a 49 y.o. male who presents 2 weeks S/P right knee arthroscopy with chondroplasty of patella, medial and lateral compartment. DOS 25. Patient is doing well post operatively, he does note an increase in pain after his nerve block wore off. History of right tibial plateau ORIF with Dr Canales 24 due to injury/fracture sustained at work. This is a workman's compensation case. Patient is ambulating with out issue, he starts physical therapy today.     PAST MEDICAL  "HISTORY:  Past Medical History[1]    PAST SURGICAL HISTORY:  Past Surgical History[2]    FAMILY HISTORY:  Family History[3]    SOCIAL HISTORY:  Social History[4]    MEDICATIONS:  Current Medications[5]    ALLERGIES:  Allergies[6]    LABS:  HgA1c:   Lab Results   Component Value Date    HGBA1C 7.6 (H) 06/18/2025     BMP:   Lab Results   Component Value Date    CALCIUM 9.3 06/18/2025    K 3.9 06/18/2025    CO2 23 06/18/2025     06/18/2025    BUN 17 06/18/2025    CREATININE 0.88 06/18/2025     CBC: No components found for: \"CBC\"    _____________________________________________________  Review of Systems   Constitutional:  Negative for chills and fever.   HENT:  Negative for ear pain and sore throat.    Eyes:  Negative for pain and visual disturbance.   Respiratory:  Negative for cough and shortness of breath.    Cardiovascular:  Negative for chest pain and palpitations.   Gastrointestinal:  Negative for abdominal pain and vomiting.   Genitourinary:  Negative for dysuria and hematuria.   Musculoskeletal:  Negative for arthralgias and back pain.   Skin:  Negative for color change and rash.   Neurological:  Negative for seizures and syncope.   All other systems reviewed and are negative.       Right Knee Exam     Range of Motion   Extension:  0   Flexion:  100     Tests   Varus: negative Valgus: negative    Other   Erythema: absent  Sensation: normal  Pulse: present  Swelling: none  Effusion: no effusion present    Comments:  Incision clean, dry and intact  No sign of infection   Knee stable to stress  Patella tracks midline and flat   Calf compartments are soft and supple  (-) Marques's sign  2+ DP and PT pulses with brisk capillary refill to the toes  Sural, saphenous, tibial, superficial, and deep peroneal motor and sensory distributions intact  Sensation light touch intact distally               Physical Exam  Vitals and nursing note reviewed.   Constitutional:       Appearance: Normal appearance.   HENT:      " Head: Normocephalic and atraumatic.      Right Ear: External ear normal.      Left Ear: External ear normal.     Eyes:      Extraocular Movements: Extraocular movements intact.      Conjunctiva/sclera: Conjunctivae normal.       Cardiovascular:      Rate and Rhythm: Normal rate.      Pulses: Normal pulses.   Pulmonary:      Effort: Pulmonary effort is normal.     Musculoskeletal:         General: Normal range of motion.      Cervical back: Normal range of motion and neck supple.      Right knee: No effusion.      Comments: See ortho exam     Skin:     General: Skin is warm and dry.     Neurological:      General: No focal deficit present.      Mental Status: He is alert.     Psychiatric:         Behavior: Behavior normal.        _____________________________________________________  STUDIES REVIEWED:  I personally reviewed the pertinent images and my independent interpretation is as follows:      PROCEDURES PERFORMED:  No procedures performed today.    Scribe Attestation    I,:  Colleen Bianchi am acting as a scribe while in the presence of the attending physician.:       I,:  Natanael Armenta MD personally performed the services described in this documentation    as scribed in my presence.:                    [1]  Past Medical History:  Diagnosis Date   • Diabetes mellitus (HCC)    • GERD (gastroesophageal reflux disease)    • Hyperlipidemia    • Hypertension    • PONV (postoperative nausea and vomiting)    • Prostatitis    [2]  Past Surgical History:  Procedure Laterality Date   • BICEPS TENDON REPAIR Bilateral     right 2016- left 2018   • ORIF TIBIA FRACTURE Right 09/08/2024    Procedure: OPEN REDUCTION W/ INTERNAL FIXATION (ORIF) TIBIA;  Surgeon: Mando Canales MD;  Location: [facility];  Service: Orthopedics   • [place] ARTHROSCOPY KNEE W/MENISCUS RPR MEDIAL/LATERAL Right 6/30/2025    Procedure: Right knee arthroscopy, with chondroplasty;  Surgeon: Natanael Armenta MD;  Location: [facility];  Service:  Orthopedics   [3]  No family history on file.[4]  Social History  Tobacco Use   • Smoking status: Never   • Smokeless tobacco: Never   Vaping Use   • Vaping status: Never Used   Substance Use Topics   • Alcohol use: Never   • Drug use: Never   [5]    Current Outpatient Medications:   •  atorvastatin (LIPITOR) 20 mg tablet, Take 20 mg by mouth in the morning., Disp: , Rfl:   •  benazepril (LOTENSIN) 10 mg tablet, Take 10 mg by mouth in the morning., Disp: , Rfl:   •  Empagliflozin (Jardiance) 25 MG TABS, Take 25 mg by mouth every morning, Disp: , Rfl:   •  metFORMIN (GLUCOPHAGE) 1000 MG tablet, Take 1,000 mg by mouth in the morning and 1,000 mg in the evening. Take with meals., Disp: , Rfl:   •  naproxen (Naprosyn) 500 mg tablet, Take 1 tablet (500 mg total) by mouth 2 (two) times a day with meals, Disp: 20 tablet, Rfl: 0  •  ondansetron (ZOFRAN) 4 mg tablet, Take 1 tablet (4 mg total) by mouth every 8 (eight) hours as needed for nausea or vomiting, Disp: 20 tablet, Rfl: 0  •  pantoprazole (PROTONIX) 40 mg tablet, Take 40 mg by mouth in the morning., Disp: , Rfl:   •  sitaGLIPtin (JANUVIA) 100 mg tablet, Take 100 mg by mouth in the morning., Disp: , Rfl:   •  acetaminophen (TYLENOL) 500 mg tablet, Take 2 tablets (1,000 mg total) by mouth every 8 (eight) hours (Patient not taking: Reported on 7/15/2025), Disp: 60 tablet, Rfl: 0  •  aspirin 325 mg tablet, Take 1 tablet (325 mg total) by mouth daily (Patient not taking: Reported on 7/15/2025), Disp: 42 tablet, Rfl: 0  •  oxyCODONE (Roxicodone) 5 immediate release tablet, Take 1 tablet (5 mg total) by mouth every 6 (six) hours as needed for severe pain for up to 10 doses Max Daily Amount: 20 mg (Patient not taking: Reported on 7/15/2025), Disp: 10 tablet, Rfl: 0  •  senna-docusate sodium (SENOKOT-S) 8.6-50 mg per tablet, Take 1 tablet by mouth daily for 14 days (Patient not taking: Reported on 7/15/2025), Disp: 14 tablet, Rfl: 0[6]  No Known Allergies

## 2025-07-29 VITALS — WEIGHT: 211 LBS | HEIGHT: 67 IN | BODY MASS INDEX: 33.12 KG/M2

## 2025-07-29 DIAGNOSIS — Z98.890 S/P RIGHT KNEE ARTHROSCOPY: Primary | ICD-10-CM

## 2025-07-29 DIAGNOSIS — E11.9 TYPE 2 DIABETES MELLITUS WITHOUT COMPLICATION, WITHOUT LONG-TERM CURRENT USE OF INSULIN (HCC): ICD-10-CM

## 2025-07-29 DIAGNOSIS — Z98.890 S/P ORIF (OPEN REDUCTION INTERNAL FIXATION) FRACTURE: ICD-10-CM

## 2025-07-29 DIAGNOSIS — Z87.81 S/P ORIF (OPEN REDUCTION INTERNAL FIXATION) FRACTURE: ICD-10-CM

## 2025-07-29 PROCEDURE — 20610 DRAIN/INJ JOINT/BURSA W/O US: CPT | Performed by: ORTHOPAEDIC SURGERY

## 2025-07-29 PROCEDURE — 99024 POSTOP FOLLOW-UP VISIT: CPT | Performed by: ORTHOPAEDIC SURGERY

## 2025-07-29 RX ADMIN — TRIAMCINOLONE ACETONIDE 40 MG: 40 INJECTION, SUSPENSION INTRA-ARTICULAR; INTRAMUSCULAR at 13:00

## 2025-08-01 RX ORDER — TRIAMCINOLONE ACETONIDE 40 MG/ML
40 INJECTION, SUSPENSION INTRA-ARTICULAR; INTRAMUSCULAR
Status: COMPLETED | OUTPATIENT
Start: 2025-07-29 | End: 2025-07-29

## (undated) DEVICE — DRAPE C-ARMOUR

## (undated) DEVICE — MEDI-VAC YANK SUCT HNDL W/TPRD BULBOUS TIP: Brand: CARDINAL HEALTH

## (undated) DEVICE — BETHLEHEM UNIV MAJ EXT ,KIT: Brand: CARDINAL HEALTH

## (undated) DEVICE — Device

## (undated) DEVICE — ACE WRAP 6 IN UNSTERILE

## (undated) DEVICE — BANDAGE, ESMARK LF STR 6"X9' (20/CS): Brand: CYPRESS

## (undated) DEVICE — U-DRAPE: Brand: CONVERTORS

## (undated) DEVICE — DUAL SPIKE ADAPTER: Brand: CONMED

## (undated) DEVICE — GLOVE SRG BIOGEL 9

## (undated) DEVICE — SUT ETHILON 3-0 PS-1 18 IN 1663G

## (undated) DEVICE — 3.5MM CORTEX SCREW SELF-TAPPING 45MM: Type: IMPLANTABLE DEVICE | Site: LEG | Status: NON-FUNCTIONAL

## (undated) DEVICE — ASTOUND STANDARD SURGICAL GOWN, XL: Brand: CONVERTORS

## (undated) DEVICE — SUT VICRYL PLUS 1 CTB-1 36 IN VCPB947H

## (undated) DEVICE — DRESSING MEPILEX AG BORDER POST-OP 4 X 8 IN

## (undated) DEVICE — ASTOUND SURGICAL GOWN, XXX LARGE, X-LONG: Brand: CONVERTORS

## (undated) DEVICE — TIBURON EXTREMITY SHEET: Brand: CONVERTORS

## (undated) DEVICE — ABDOMINAL PAD: Brand: DERMACEA

## (undated) DEVICE — PACK ARTHROSCOPY

## (undated) DEVICE — 1016 S-DRAPE IRRIG POUCH 10/BOX: Brand: STERI-DRAPE™

## (undated) DEVICE — CHLORAPREP HI-LITE 26ML ORANGE

## (undated) DEVICE — GLOVE INDICATOR PI UNDERGLOVE SZ 9 BLUE

## (undated) DEVICE — 3M™ STERI-DRAPE™ U-DRAPE 1015: Brand: STERI-DRAPE™

## (undated) DEVICE — 2.5MM DRILL BIT/QC/GOLD/110MM

## (undated) DEVICE — GLOVE SRG BIOGEL 7

## (undated) DEVICE — TOWEL SURG XR DETECT GREEN STRL RFD

## (undated) DEVICE — SUT VICRYL PLUS 2-0 CTB-1 27 IN VCPB259H

## (undated) DEVICE — OCCLUSIVE GAUZE STRIP,3% BISMUTH TRIBROMOPHENATE IN PETROLATUM BLEND: Brand: XEROFORM

## (undated) DEVICE — PADDING CAST 4 IN  COTTON STRL

## (undated) DEVICE — STERILE DOUBLE BASIN SET PACK: Brand: CARDINAL HEALTH

## (undated) DEVICE — GLOVE INDICATOR PI UNDERGLOVE SZ 7 BLUE

## (undated) DEVICE — SPONGE GAUZE 4 X 8 12 PLY STRL LF

## (undated) DEVICE — ORTHOPEDIC PACK: Brand: CARDINAL HEALTH

## (undated) DEVICE — C-ARM: Brand: UNBRANDED

## (undated) DEVICE — INTENDED FOR TISSUE SEPARATION, AND OTHER PROCEDURES THAT REQUIRE A SHARP SURGICAL BLADE TO PUNCTURE OR CUT.: Brand: BARD-PARKER ® CARBON RIB-BACK BLADES

## (undated) DEVICE — BLADE SHAVER OD4 MM 2 INNER CUT L13 CM COOLCUT TORPEDO

## (undated) DEVICE — 2.5MM DRILL BIT/QC/GOLD/180MM

## (undated) DEVICE — PROXIMATE PLUS MD MULTI-DIRECTIONAL RELEASE SKIN STAPLERS CONTAINS 35 STAINLESS STEEL STAPLES APPROXIMATE CLOSED DIMENSIONS: 6.9MM X 3.9MM WIDE: Brand: PROXIMATE

## (undated) DEVICE — FOOT SWITCH DRAPE: Brand: UNBRANDED

## (undated) DEVICE — PLUMEPEN PRO 10FT

## (undated) DEVICE — 3.5MM DRILL BIT/QC/110MM